# Patient Record
Sex: FEMALE | Employment: FULL TIME | URBAN - METROPOLITAN AREA
[De-identification: names, ages, dates, MRNs, and addresses within clinical notes are randomized per-mention and may not be internally consistent; named-entity substitution may affect disease eponyms.]

---

## 2023-01-30 ENCOUNTER — OFFICE VISIT (OUTPATIENT)
Dept: OBGYN CLINIC | Age: 33
End: 2023-01-30
Payer: COMMERCIAL

## 2023-01-30 VITALS
DIASTOLIC BLOOD PRESSURE: 74 MMHG | WEIGHT: 181 LBS | HEIGHT: 68 IN | SYSTOLIC BLOOD PRESSURE: 106 MMHG | BODY MASS INDEX: 27.43 KG/M2

## 2023-01-30 DIAGNOSIS — N81.11 CYSTOCELE, MIDLINE: ICD-10-CM

## 2023-01-30 DIAGNOSIS — Z01.419 ROUTINE GYNECOLOGICAL EXAMINATION: Primary | ICD-10-CM

## 2023-01-30 PROBLEM — R31.21 ASYMPTOMATIC MICROSCOPIC HEMATURIA: Status: ACTIVE | Noted: 2023-01-30

## 2023-01-30 PROCEDURE — 99385 PREV VISIT NEW AGE 18-39: CPT | Performed by: OBSTETRICS & GYNECOLOGY

## 2023-01-30 NOTE — PROGRESS NOTES
Itzel Gomez is a 33 y.o. female returns for an annual exam     Chief Complaint   Patient presents with    Well Woman       No LMP recorded. (Menstrual status: Other (see Comment)). Her periods are  absent patient is breast feeding . Problems: no significant problems  Birth Control: condoms. Last Pap: normal obtained 2/7/2022. She does not have a history of LU 2, 3 or cervical cancer. With regard to the Gardisil vaccine, she has not received it yet. 1. Have you been to the ER, urgent care clinic, or hospitalized since your last visit? No    2. Have you seen or consulted any other health care providers outside of the [address] [place] [address] since your last visit?  No    Examination chaperoned by Danette Bush MA.

## 2023-01-30 NOTE — PROGRESS NOTES
ANNUAL EXAM AGES 18-39    Maynor Hernandez is a 33y.o. year old  UNAVAILABLE female   No LMP recorded. (Menstrual status: Other (see Comment)). She presents for her annual checkup. No periods. Has been nursing her 15 month old still. No unusual vaginal discharges or urinary symptoms. No health changes. Reviewed breast cancer history and screening advice. Medical History:  Patient Active Problem List   Diagnosis Code    Family history of breast cancer Z80.3    Asymptomatic microscopic hematuria R31.21       History reviewed. No pertinent past medical history. History reviewed. No pertinent surgical history. OB History    Para Term  AB Living   2 2 2 0 0 2   SAB IAB Ectopic Molar Multiple Live Births   0 0 0 0 0 2      # Outcome Date GA Lbr Jose Antonio/2nd Weight Sex Delivery Anes PTL Lv   2 Term 21 39w0d  7 lb 4 oz (3.289 kg) F Vag-Spont   BEBETO   1 Term 19 38w0d  8 lb 6 oz (3.799 kg) M Vag-Spont   BEBETO     Social History     Socioeconomic History    Marital status:     Number of children: 2   Tobacco Use    Smoking status: Never    Smokeless tobacco: Never   Vaping Use    Vaping Use: Never used   Substance and Sexual Activity    Alcohol use: Never    Drug use: Never    Sexual activity: Yes     Partners: Male     Birth control/protection: None      Family History   Problem Relation Age of Onset    Breast Cancer Mother 39        BRCA neg    Breast Cancer Maternal Aunt 60     No current outpatient medications on file prior to visit. No current facility-administered medications on file prior to visit.      No Known Allergies    Review of Systems:  History obtained from the patient-negative for:  Constitutional: weight loss, fever, night sweats  HEENT: hearing loss, earache, congestion, snoring, sorethroat  CV: chest pain, palpitations, edema  Resp: cough, shortness of breath, wheezing  Breast: breast lumps, nipple discharge, galactorrhea  GI: change in bowel habits, abdominal pain, black or bloody stools  : frequency, dysuria, hematuria, vaginal discharge  MSK: back pain, joint pain, muscle pain  Skin: itching, rash, hives  Neuro: dizziness, headache, confusion, weakness  Psych: anxiety, depression, change in mood  Heme/lymph: bleeding, bruising, pallor    Physical Exam  Visit Vitals  /74   Ht 5' 8\" (1.727 m)   Wt 181 lb (82.1 kg)   Breastfeeding Yes   BMI 27.52 kg/m²       Constitutional  Appearance: well-nourished, well developed, alert, in no acute distress    HENT  Head and Face: appears normal    Neck  Inspection/Palpation: normal appearance, no masses or tenderness  Lymph Nodes: no lymphadenopathy present  Thyroid: gland size normal, nontender, no nodules or masses present on palpation    Chest  Respiratory Effort: breathing unlabored  Auscultation: normal breath sounds    Cardiovascular  Heart:   Auscultation: regular rate and rhythm without murmur    Breasts  Inspection of Breasts: breasts symmetrical, no skin changes, no discharge present, nipple appearance normal, no skin retraction present  Palpation of Breasts and Axillae: no masses present on palpation, no breast tenderness  Axillary Lymph Nodes: no lymphadenopathy present    Gastrointestinal  Abdominal Examination: abdomen non-tender to palpation, normal bowel sounds, no masses present  Liver and spleen: no hepatomegaly present, spleen not palpable  Hernias: no hernias identified    Genitourinary  External Genitalia: normal appearance for age, no discharge present, no tenderness present, no inflammatory lesions present, no masses present, no atrophy present  Vagina: normal vaginal vault with mild cystocele, no discharge present, no inflammatory lesions present, no masses present  Bladder: non-tender to palpation  Urethra: appears normal  Cervix: normal   Uterus: normal size, shape and consistency  Adnexa: no adnexal tenderness present, no adnexal masses present  Perineum: perineum within normal limits, no evidence of trauma, no rashes or skin lesions present  Anus: anus within normal limits, no hemorrhoids present  Inguinal Lymph Nodes: no lymphadenopathy present    Skin  General Inspection: no rash, no lesions identified    Neurologic/Psychiatric  Mental Status:  Orientation: grossly oriented to person, place and time  Mood and Affect: mood normal, affect appropriate    Labs:  No results found for this or any previous visit (from the past 12 hour(s)). Assessment:    ICD-10-CM ICD-9-CM    1. Routine gynecological examination  Z01.419 V72.31 PAP IG, APTIMA HPV AND RFX 16/18,45 ([id number])      2.  Cystocele, midline  N81.11 618.01           Plan:  Counseled re: diet, exercise, healthy lifestyle  Rec screening mammo at 36  Kegel's advised  Return in about 1 year (around 1/30/2024) for Annual.

## 2024-01-08 ENCOUNTER — TELEPHONE (OUTPATIENT)
Age: 34
End: 2024-01-08

## 2024-01-08 ENCOUNTER — HOSPITAL ENCOUNTER (EMERGENCY)
Facility: HOSPITAL | Age: 34
Discharge: HOME | End: 2024-01-08
Attending: EMERGENCY MEDICINE
Payer: COMMERCIAL

## 2024-01-08 VITALS
RESPIRATION RATE: 15 BRPM | BODY MASS INDEX: 26.83 KG/M2 | OXYGEN SATURATION: 100 % | WEIGHT: 177 LBS | HEIGHT: 68 IN | TEMPERATURE: 98.5 F | SYSTOLIC BLOOD PRESSURE: 112 MMHG | HEART RATE: 94 BPM | DIASTOLIC BLOOD PRESSURE: 63 MMHG

## 2024-01-08 DIAGNOSIS — R11.10 HYPEREMESIS: Primary | ICD-10-CM

## 2024-01-08 DIAGNOSIS — K52.9 GASTROENTERITIS: ICD-10-CM

## 2024-01-08 LAB
ANION GAP BLD CALC-SCNC: 10 (ref 10–20)
BASOPHILS # BLD: 0 K/UL (ref 0–0.1)
BASOPHILS NFR BLD: 0 % (ref 0–1)
CA-I BLD-MCNC: 1.21 MMOL/L (ref 1.12–1.32)
CHLORIDE BLD-SCNC: 102 MMOL/L (ref 100–108)
CO2 BLD-SCNC: 25 MMOL/L (ref 19–24)
CREAT UR-MCNC: 0.3 MG/DL (ref 0.6–1.3)
DIFFERENTIAL METHOD BLD: ABNORMAL
EOSINOPHIL # BLD: 0 K/UL (ref 0–0.4)
EOSINOPHIL NFR BLD: 0 % (ref 0–7)
ERYTHROCYTE [DISTWIDTH] IN BLOOD BY AUTOMATED COUNT: 12.9 % (ref 11.5–14.5)
GLUCOSE BLD STRIP.AUTO-MCNC: 90 MG/DL (ref 74–106)
HCT VFR BLD AUTO: 37.4 % (ref 35–47)
HGB BLD-MCNC: 13.8 G/DL (ref 11.5–16)
IMM GRANULOCYTES # BLD AUTO: 0 K/UL (ref 0–0.04)
IMM GRANULOCYTES NFR BLD AUTO: 0 % (ref 0–0.5)
LACTATE BLD-SCNC: 0.44 MMOL/L (ref 0.4–2)
LYMPHOCYTES # BLD: 0.7 K/UL (ref 0.8–3.5)
LYMPHOCYTES NFR BLD: 10 % (ref 12–49)
MCH RBC QN AUTO: 31.7 PG (ref 26–34)
MCHC RBC AUTO-ENTMCNC: 36.9 G/DL (ref 30–36.5)
MCV RBC AUTO: 86 FL (ref 80–99)
MONOCYTES # BLD: 0.4 K/UL (ref 0–1)
MONOCYTES NFR BLD: 5 % (ref 5–13)
NEUTS SEG # BLD: 6.1 K/UL (ref 1.8–8)
NEUTS SEG NFR BLD: 85 % (ref 32–75)
NRBC # BLD: 0 K/UL (ref 0–0.01)
NRBC BLD-RTO: 0 PER 100 WBC
PLATELET # BLD AUTO: 236 K/UL (ref 150–400)
PMV BLD AUTO: 9.9 FL (ref 8.9–12.9)
POTASSIUM BLD-SCNC: 3.8 MMOL/L (ref 3.5–5.5)
RBC # BLD AUTO: 4.35 M/UL (ref 3.8–5.2)
RBC MORPH BLD: ABNORMAL
SERVICE CMNT-IMP: ABNORMAL
SODIUM BLD-SCNC: 137 MMOL/L (ref 136–145)
SPECIMEN SITE: ABNORMAL
WBC # BLD AUTO: 7.2 K/UL (ref 3.6–11)

## 2024-01-08 PROCEDURE — 99284 EMERGENCY DEPT VISIT MOD MDM: CPT

## 2024-01-08 PROCEDURE — 6360000002 HC RX W HCPCS: Performed by: PHYSICIAN ASSISTANT

## 2024-01-08 PROCEDURE — 2580000003 HC RX 258: Performed by: PHYSICIAN ASSISTANT

## 2024-01-08 PROCEDURE — 96374 THER/PROPH/DIAG INJ IV PUSH: CPT

## 2024-01-08 PROCEDURE — 83605 ASSAY OF LACTIC ACID: CPT

## 2024-01-08 PROCEDURE — 80047 BASIC METABLC PNL IONIZED CA: CPT

## 2024-01-08 PROCEDURE — 85025 COMPLETE CBC W/AUTO DIFF WBC: CPT

## 2024-01-08 PROCEDURE — 36415 COLL VENOUS BLD VENIPUNCTURE: CPT

## 2024-01-08 PROCEDURE — 96361 HYDRATE IV INFUSION ADD-ON: CPT

## 2024-01-08 PROCEDURE — 2580000003 HC RX 258: Performed by: EMERGENCY MEDICINE

## 2024-01-08 RX ORDER — SODIUM CHLORIDE, SODIUM LACTATE, POTASSIUM CHLORIDE, AND CALCIUM CHLORIDE .6; .31; .03; .02 G/100ML; G/100ML; G/100ML; G/100ML
1000 INJECTION, SOLUTION INTRAVENOUS ONCE
Status: COMPLETED | OUTPATIENT
Start: 2024-01-08 | End: 2024-01-08

## 2024-01-08 RX ORDER — ONDANSETRON 4 MG/1
4 TABLET, FILM COATED ORAL 3 TIMES DAILY PRN
Qty: 60 TABLET | Refills: 0 | Status: SHIPPED | OUTPATIENT
Start: 2024-01-08 | End: 2024-02-07

## 2024-01-08 RX ORDER — METOCLOPRAMIDE HYDROCHLORIDE 5 MG/ML
10 INJECTION INTRAMUSCULAR; INTRAVENOUS
Status: COMPLETED | OUTPATIENT
Start: 2024-01-08 | End: 2024-01-08

## 2024-01-08 RX ORDER — 0.9 % SODIUM CHLORIDE 0.9 %
1000 INTRAVENOUS SOLUTION INTRAVENOUS ONCE
Status: DISCONTINUED | OUTPATIENT
Start: 2024-01-08 | End: 2024-01-08

## 2024-01-08 RX ADMIN — SODIUM CHLORIDE, POTASSIUM CHLORIDE, SODIUM LACTATE AND CALCIUM CHLORIDE 1000 ML: 600; 310; 30; 20 INJECTION, SOLUTION INTRAVENOUS at 14:24

## 2024-01-08 RX ADMIN — METOCLOPRAMIDE 10 MG: 5 INJECTION, SOLUTION INTRAMUSCULAR; INTRAVENOUS at 13:24

## 2024-01-08 RX ADMIN — SODIUM CHLORIDE, POTASSIUM CHLORIDE, SODIUM LACTATE AND CALCIUM CHLORIDE 1000 ML: 600; 310; 30; 20 INJECTION, SOLUTION INTRAVENOUS at 13:24

## 2024-01-08 ASSESSMENT — PAIN - FUNCTIONAL ASSESSMENT
PAIN_FUNCTIONAL_ASSESSMENT: NONE - DENIES PAIN
PAIN_FUNCTIONAL_ASSESSMENT: NONE - DENIES PAIN

## 2024-01-08 NOTE — TELEPHONE ENCOUNTER
Patient advised of MD sent medication to her pharmacy and is going to the er for fluids    Patient verbalized understanding.

## 2024-01-08 NOTE — TELEPHONE ENCOUNTER
Two patient identifiers used      33 year old patient last seen in the office on 1/30//2023 for ae    Patient reports her LMP is 11/15/2023 ( 7w5d ) pregnant    Patient has first ob and ultrasound on 1/23/2024    Patient denies vaginal bleeding and is having nausea and vomiting     Patient reports she can barely keep fluids down and is voiding in small amounts    Patient dealing with the nausea for about a week now and it is getting worse    Patient has tried all the routine things to try, Gatorade, ginger ale , bland foods with out much success    Patient reports zofran ODT in the passed has made her nauseous to keep on her tongue    Pharmacy confirmed    Please advise    Thank you      ? Need er for fluids

## 2024-01-08 NOTE — DISCHARGE INSTRUCTIONS
Continue drinking plenty of liquids.  Take Unisom and vitamin B6 as directed for pregnancy related nausea and vomiting.  You can take Zofran as prescribed by your OB.  Continue to wash hands regularly, try to use a separate bathroom from the rest of the household who are not sick and be sure to disinfect the toilet frequently until your symptoms resolve.   Avoid sharing drinks, utensils or kissing others.  Return to the emergency department if your symptoms worsen.

## 2024-01-08 NOTE — ED PROVIDER NOTES
[facility] EMERGENCY DEPT  EMERGENCY DEPARTMENT ENCOUNTER      Pt Name: Lucrecia Pacheco  MRN: [id number]  Birthdate 1990  Date of evaluation: 2024  Provider: Noel Mckeon PA-C    CHIEF COMPLAINT       Chief Complaint   Patient presents with    Emesis         HISTORY OF PRESENT ILLNESS   (Location/Symptom, Timing/Onset, Context/Setting, Quality, Duration, Modifying Factors, Severity)  Note limiting factors.   34yo  7w5d pregnant who presents ambulatory c/o \"I have been having vomiting 3 times this pregnancy but my kids have the GI bug so I caught that last night.\" C/o vomiting 3 time a day at baseline with this pregnancy, c/w previous pregnancies with hyperemesis. Her 1yo and 5yo had vomiting and diarrhea over the weekend, last night she had 3 loose stools, 8 episodes of emesis, today 3 episodes of emesis- all non-bloody. Denies abd pain, pelvic pain, vaginal discharge or urinary sx's. No flank pain, CP, SOB, fever, chills. OB called in Zofran but she has not filled it yet, states it hasn't helped her much in the past. Has not had confirmatory US with this pregnancy yet but is scheduled to have it with OB on .    OB: Victorino Jenkins    The history is provided by the patient.         Review of External Medical Records:     Nursing Notes were reviewed.    REVIEW OF SYSTEMS    (2-9 systems for level 4, 10 or more for level 5)     Review of Systems    Except as noted above the remainder of the review of systems was reviewed and negative.       PAST MEDICAL HISTORY   History reviewed. No pertinent past medical history.      SURGICAL HISTORY     History reviewed. No pertinent surgical history.      CURRENT MEDICATIONS       Previous Medications    ONDANSETRON (ZOFRAN) 4 MG TABLET    Take 1 tablet by mouth 3 times daily as needed for Nausea or Vomiting       ALLERGIES     Patient has no known allergies.    FAMILY HISTORY       Family History   Problem Relation Age of Onset    Breast Cancer Mother 45        BRCA

## 2024-01-08 NOTE — TELEPHONE ENCOUNTER
Donadlo Jenkins II, MD   to Me       1/8/24  9:32 AM   iF she can't keep anything down then yes ER.  We can also do some phenergan supps.  Can she just swallow the zofran with a sip?        Patient was advised of MD recommendations and can take the zofran with sip of water     Patient would like prescriptions sent to her confirmed pharmacy    Patient was encouraged to go to urgent care [facility] to get fluids, reports she did also have stomach virus last night     Please send  Rx      Thank you

## 2024-01-23 ENCOUNTER — ROUTINE PRENATAL (OUTPATIENT)
Age: 34
End: 2024-01-23

## 2024-01-23 VITALS
BODY MASS INDEX: 26.61 KG/M2 | WEIGHT: 175.6 LBS | SYSTOLIC BLOOD PRESSURE: 106 MMHG | DIASTOLIC BLOOD PRESSURE: 74 MMHG | HEIGHT: 68 IN

## 2024-01-23 DIAGNOSIS — Z3A.09 9 WEEKS GESTATION OF PREGNANCY: Primary | ICD-10-CM

## 2024-01-23 DIAGNOSIS — O46.8X1 SUBCHORIONIC HEMORRHAGE OF PLACENTA IN FIRST TRIMESTER, SINGLE OR UNSPECIFIED FETUS: ICD-10-CM

## 2024-01-23 DIAGNOSIS — O41.8X10 SUBCHORIONIC HEMORRHAGE OF PLACENTA IN FIRST TRIMESTER, SINGLE OR UNSPECIFIED FETUS: ICD-10-CM

## 2024-01-23 PROBLEM — K75.9 INFLAMMATORY LIVER DISEASE: Status: ACTIVE | Noted: 2024-01-23

## 2024-01-23 PROBLEM — O20.8 SUBCHORIONIC HEMORRHAGE IN FIRST TRIMESTER: Status: ACTIVE | Noted: 2024-01-23

## 2024-01-23 LAB
ABO, EXTERNAL RESULT: NORMAL
HEP B, EXTERNAL RESULT: NEGATIVE
HIV, EXTERNAL RESULT: NONREACTIVE
RH FACTOR, EXTERNAL RESULT: POSITIVE
RPR, EXTERNAL RESULT: NONREACTIVE
RUBELLA TITER, EXTERNAL RESULT: NORMAL

## 2024-01-23 PROCEDURE — 0500F INITIAL PRENATAL CARE VISIT: CPT | Performed by: OBSTETRICS & GYNECOLOGY

## 2024-01-23 NOTE — PROGRESS NOTES
Lucrecia Pacheco is a 33 y.o. female presents for a new pregnancy visit.    Chief Complaint   Patient presents with    Initial Prenatal Visit            Patient's last menstrual period was 11/15/2023 (approximate).    Last Pap: normal obtained 2023.    LMP history:  The date of her LMP is 11/15/2023 certain.  Her last menstrual period was normal and lasted for 4 to 5 days.  A urine pregnancy test was positive 4 weeks ago. She was not on the pill at conception.     Based on her LMP, her EDC is 2024 and her EGA is 9 weeks,6 days. Her menstrual cycles are regular and occur approximately every 28 days and range from 3 to 5 days. The last menses lasted  the usual number of days.      Ultrasound data:  She had an ultrasound done by the ultrasound tech today which revealed a viable garcias pregnancy with a gestational age of 9 weeks and 4 days giving an EDC of 2024.TA ULTRASOUND PERFORMED A SINGLE VIABLE 9W4D IUP IS SEEN WITH NORMAL CARDIAC RHYTHM. GESTATIONAL AGE BASED ON TODAY'S ULTRASOUND. A NORMAL YOLK SAC IS SEEN. THERE APPEARS TO BE A SUBCHORIONIC HEMORRHAGE THAT MEASURES 23 X 25 X 18MM. RIGHT OVARY APPEARS WITHIN NORMAL LIMITS. LEFT OVARY APPEARS WITHIN NORMAL LIMITS. NO FREE FLUID SEEN IN THE CDS.     Pregnancy symptoms:    Since her LMP she has experienced urinary frequency, breast tenderness, and nausea.   She has been vomiting over the last few weeks.  Associated signs and symptoms which she denies: dysuria, discharge, vaginal bleeding.    She states she has gained weight:  Approximately 5 pounds over the last few weeks.    Relevant past pregnancy history:   She has the following pregnancy history:     She has no history of  delivery.    Relevant past medical history:(relevant to this pregnancy): noncontributory.      Her occupation is: .     1. Have you been to the ER, urgent care clinic, or hospitalized since your last visit? Yes hyperemesis 2024.    2. Have you seen or 
History obtained from the patient-negative for:  Constitutional: weight loss, fever, night sweats  HEENT: hearing loss, earache, congestion, snoring, sorethroat  CV: chest pain, palpitations, edema  Resp: cough, shortness of breath, wheezing  Breast: breast lumps, nipple discharge, galactorrhea  GI: change in bowel habits, abdominal pain, black or bloody stools  : frequency, dysuria, hematuria, vaginal discharge  MSK: back pain, joint pain, muscle pain  Skin: itching, rash, hives  Neuro: dizziness, headache, confusion, weakness  Psych: anxiety, depression, change in mood  Heme/lymph: bleeding, bruising, pallor      Objective:  /74   Ht 1.727 m (5' 8\")   Wt 79.7 kg (175 lb 9.6 oz)   LMP 11/15/2023 (Approximate)   BMI 26.70 kg/m²     Physical Exam:     Constitutional  Appearance: well-nourished, well developed, alert, in no acute distress    HENT  Head  Face: appears normal  Eyes: appear normal  Ears: normal  Mouth: normal  Lips: no lesions    Gastrointestinal  Abdominal Examination: abdomen non-tender to palpation, normal bowel sounds, no masses present  Liver and spleen: no hepatomegaly present, spleen not palpable  Hernias: no hernias identified    Genitourinary  External Genitalia: normal appearance for age, no discharge present, no tenderness present, no inflammatory lesions present, no masses present, no atrophy present  Vagina: normal vaginal vault without central or paravaginal defects, no discharge present, no inflammatory lesions present, no masses present  Bladder: non-tender to palpation  Urethra: appears normal  Cervix: normal   Uterus: enlarged, normal shape, soft  Adnexa: no adnexal tenderness present, no adnexal masses present  Perineum: perineum within normal limits, no evidence of trauma, no rashes or skin lesions present  Anus: anus within normal limits, no hemorrhoids present  Inguinal Lymph Nodes: no lymphadenopathy present    Skin  General Inspection: no rash, no lesions

## 2024-01-24 PROBLEM — E55.9 VITAMIN D DEFICIENCY: Status: ACTIVE | Noted: 2024-01-24

## 2024-01-24 PROBLEM — O21.0 HYPEREMESIS GRAVIDARUM: Status: ACTIVE | Noted: 2024-01-24

## 2024-01-24 LAB
25(OH)D3+25(OH)D2 SERPL-MCNC: 14.2 NG/ML (ref 30–100)
ABO GROUP BLD: NORMAL
BLD GP AB SCN SERPL QL: NEGATIVE
ERYTHROCYTE [DISTWIDTH] IN BLOOD BY AUTOMATED COUNT: 13.1 % (ref 11.7–15.4)
HBA1C MFR BLD: 4.9 % (ref 4.8–5.6)
HBV SURFACE AG SERPL QL IA: NEGATIVE
HCT VFR BLD AUTO: 40.6 % (ref 34–46.6)
HCV IGG SERPL QL IA: NON REACTIVE
HGB BLD-MCNC: 13.3 G/DL (ref 11.1–15.9)
HIV 1+2 AB+HIV1 P24 AG SERPL QL IA: NON REACTIVE
MCH RBC QN AUTO: 29.4 PG (ref 26.6–33)
MCHC RBC AUTO-ENTMCNC: 32.8 G/DL (ref 31.5–35.7)
MCV RBC AUTO: 90 FL (ref 79–97)
PLATELET # BLD AUTO: 318 X10E3/UL (ref 150–450)
RBC # BLD AUTO: 4.52 X10E6/UL (ref 3.77–5.28)
RH BLD: POSITIVE
RPR SER QL: NON REACTIVE
RUBV IGG SERPL IA-ACNC: 2.63 INDEX
WBC # BLD AUTO: 6.2 X10E3/UL (ref 3.4–10.8)

## 2024-02-13 ENCOUNTER — ROUTINE PRENATAL (OUTPATIENT)
Age: 34
End: 2024-02-13

## 2024-02-13 VITALS — SYSTOLIC BLOOD PRESSURE: 97 MMHG | BODY MASS INDEX: 26.3 KG/M2 | DIASTOLIC BLOOD PRESSURE: 68 MMHG | WEIGHT: 173 LBS

## 2024-02-13 DIAGNOSIS — Z3A.12 12 WEEKS GESTATION OF PREGNANCY: Primary | ICD-10-CM

## 2024-02-13 PROCEDURE — 0502F SUBSEQUENT PRENATAL CARE: CPT | Performed by: OBSTETRICS & GYNECOLOGY

## 2024-02-13 RX ORDER — PROMETHAZINE HYDROCHLORIDE 12.5 MG/1
12.5 TABLET ORAL 4 TIMES DAILY PRN
Qty: 20 TABLET | Refills: 0 | Status: SHIPPED | OUTPATIENT
Start: 2024-02-13 | End: 2024-02-20

## 2024-02-13 NOTE — PROGRESS NOTES
OB VISIT      Current EGA:   12w6d    Visit Notes:  Doing Well. No VB.   Had US today due to SC hemorrhage noted last visit    Total weight gain is Not found..   (Total expected for pregnancy is Could not be calculated)  Last Weight Metrics:      2/13/2024    10:49 AM 1/23/2024    10:20 AM 1/8/2024    12:05 PM 1/30/2023     2:08 PM   Weight Loss Metrics   Height  5' 8\" 5' 8\" 5' 8\"   Weight - Scale 173 lbs 175 lbs 10 oz 177 lbs 181 lbs   BMI (Calculated) 0 kg/m2 26.8 kg/m2 27 kg/m2 27.6 kg/m2        Vitals:    02/13/24 1049   BP: 97/68   Weight: 78.5 kg (173 lb)        Problem List:  Dating by LMP = 9 week US  Subchorionic Hemorrhage @9w 25mm Rescan 12 wk 36mm  Hyperemesis - B6/Unisom  Vitamin D Def 14.  (5000iu)    PN Flowsheet Data:  Prenatal Fetal Information  Fetal HR: U/S  Movement: Absent Albumin: Trace  Glucose: Negative Comments: nausea and vomiting Panorama mod ketone       Current Outpatient Medications   Medication Instructions    Doxylamine Succinate, Sleep, (UNISOM PO) Oral    Prenatal Vit-Fe Fumarate-FA (PRENATAL VITAMIN PO) Oral    promethazine (PHENERGAN) 12.5 mg, Oral, 4 TIMES DAILY PRN    Pyridoxine HCl (B-6 PO) Oral        Visit Diagnoses:   Diagnosis Orders   1. 12 weeks gestation of pregnancy  Panorama Prenatal Test          Follow Up:  Return in about 4 weeks (around 3/12/2024) for Routine OB, Lab Work.  For Panorama NIPT today  Try promethazine  Donaldo Jenkins MD, FACOG  02/13/24  10:57 AM

## 2024-02-13 NOTE — PROGRESS NOTES
LIMITED OB SCAN A SINGLE 12W6D IUP IS SEEN. FETAL CARDIAC MOTION OBSERVED. THERE APPEARS TO BE A LEFT INDRA SUBCHORIONIC HEMORRHAGE THAT MEASURES 36 X 16 X 31MM. APPROPRIATE FETAL GROWTH IS SEEN. SIZE=DATES. SARAH AND PLACENTA APPEAR WITHIN NORMAL LIMITS

## 2024-03-15 ENCOUNTER — ROUTINE PRENATAL (OUTPATIENT)
Age: 34
End: 2024-03-15

## 2024-03-15 VITALS — DIASTOLIC BLOOD PRESSURE: 70 MMHG | BODY MASS INDEX: 26.91 KG/M2 | SYSTOLIC BLOOD PRESSURE: 103 MMHG | WEIGHT: 177 LBS

## 2024-03-15 DIAGNOSIS — Z3A.17 17 WEEKS GESTATION OF PREGNANCY: ICD-10-CM

## 2024-03-15 DIAGNOSIS — Z3A.17 17 WEEKS GESTATION OF PREGNANCY: Primary | ICD-10-CM

## 2024-03-15 NOTE — PROGRESS NOTES
OB VISIT      Current EGA:   17w2d    Visit Notes:  Doing Well. +Fetal Movement.  No Ucs. No LOF or VB.     Total weight gain is 0.635 kg (1 lb 6.4 oz).   (Total expected for pregnancy is 7 kg (15 lb)-11.5 kg (25 lb))  Last Weight Metrics:      3/15/2024     9:06 AM 2/13/2024    10:49 AM 1/23/2024    10:20 AM 1/8/2024    12:05 PM 1/30/2023     2:08 PM   Weight Loss Metrics   Height   5' 8\" 5' 8\" 5' 8\"   Weight - Scale 177 lbs 173 lbs 175 lbs 10 oz 177 lbs 181 lbs   BMI (Calculated) 0 kg/m2 0 kg/m2 26.8 kg/m2 27 kg/m2 27.6 kg/m2        Vitals:    03/15/24 0906   BP: 103/70   Weight: 80.3 kg (177 lb)        Problem List:  Dating by LMP = 9 week US  Subchorionic Hemorrhage @9w 25mm Rescan 12 wk 36mm  Hyperemesis - B6/Unisom  RESOLVED  Vitamin D Def 14.  (5000iu)  Boy (Normal NIPT)    PN Flowsheet Data:  Fundal Height (cm): 17 cm  Prenatal Fetal Information  Fundal Height (cm): 17 cm  Fetal HR: 145  Movement: Absent   Comments: n/v improved       Current Outpatient Medications   Medication Instructions    Prenatal Vit-Fe Fumarate-FA (PRENATAL VITAMIN PO) Oral    vitamin D3 (VITAMIN D3) 5,000 Units, Oral, DAILY        Visit Diagnoses:   Diagnosis Orders   1. 17 weeks gestation of pregnancy  Alpha Fetoprotein, Maternal          Follow Up:  Return in about 3 weeks (around 4/5/2024) for Routine OB, OB Ultrasound.  For AFP jada Jenkins MD, FACOG  03/15/24  9:22 AM

## 2024-03-20 LAB
AFP INTERP SERPL-IMP: NORMAL
AFP MOM SERPL: 0.77
AFP SERPL-MCNC: 27.7 NG/ML
AGE AT DELIVERY: 32.6 YR
AGE OF EGG DONOR: NORMAL
AGE OF EGG DONOR: NORMAL
COMMENT: NORMAL
DONOR EGG?: NORMAL
DONOR EGG?: NORMAL
FAMILY HISTORY NTD: NORMAL
FHX NTD (Y OR N): NORMAL
GA METHOD: NORMAL
GA: 17.3 WEEKS
IDDM PATIENT QL: NO
INSULIN DEP. DIABETIC: NORMAL
Lab: NORMAL
MAT SCN FOR FETAL ABNORMALITIES SERPL: NORMAL
MULTIPLE PREGNANCY: NO
NEURAL TUBE DEFECT RISK FETUS: NORMAL
NUMBER OF FETUSES: NORMAL
OTHER INDICATIONS: NORMAL
OTHER INDICATIONS: NORMAL
PREVIOUSLY ELEVATED AFP (Y OR N): NORMAL
PREVIOUSLY ELEVATED AFP (Y OR N): NORMAL
PRIOR 1ST TRIM TESTING ?: NORMAL
PRIOR 2ND TRIM TESTING ?: NORMAL
PRIOR DS/NTD SCREEN CURRENT PREGNANCY?: NORMAL
PRIOR DS/NTD SCREEN CURRENT PREGNANCY?: NORMAL
PRIOR FIRST TRIMESTER TESTING (Y OR N ): NORMAL
PRIOR PREGNANCY WITH DOWN SYNDROME (Y OR N): NORMAL
PRIOR PREGNANCY WITH DOWN SYNDROME (Y OR N): NORMAL
TYPE OF EGG DONOR: NORMAL
TYPE OF EGG DONOR: NORMAL

## 2024-04-05 ENCOUNTER — ROUTINE PRENATAL (OUTPATIENT)
Age: 34
End: 2024-04-05

## 2024-04-05 VITALS — DIASTOLIC BLOOD PRESSURE: 61 MMHG | SYSTOLIC BLOOD PRESSURE: 100 MMHG | BODY MASS INDEX: 27.73 KG/M2 | WEIGHT: 182.4 LBS

## 2024-04-05 DIAGNOSIS — Z3A.20 20 WEEKS GESTATION OF PREGNANCY: Primary | ICD-10-CM

## 2024-04-05 PROCEDURE — 0502F SUBSEQUENT PRENATAL CARE: CPT | Performed by: OBSTETRICS & GYNECOLOGY

## 2024-04-05 NOTE — PROGRESS NOTES
STANDARD FETAL ANATOMIC SURVEY A SINGLE VIABLE IUP AT 20W2D GA BY LMP IS SEEN. FETAL CARDIAC MOTION OBSERVED. FETAL ANATOMY WELL VISUALIZED AND APPEARS WITHIN NORMAL LIMITS. NO ABNORMALITIES ARE SEEN ON TODAY'S EXAM. APPROPRIATE FETAL GROWTH IS SEEN. SIZE=DATES. SARAH, CERVIX AND PLACENTA APPEAR WITHIN NORMAL LIMITS. GENDER: XY

## 2024-04-05 NOTE — PROGRESS NOTES
OB VISIT      Current EGA:   20w2d    Visit Notes:  Doing Well. +Fetal Movement.  No Ucs. No LOF or VB.   US today WNL    Total weight gain is 3.084 kg (6 lb 12.8 oz).   (Total expected for pregnancy is 7 kg (15 lb)-11.5 kg (25 lb))  Last Weight Metrics:      4/5/2024     1:22 PM 3/15/2024     9:06 AM 2/13/2024    10:49 AM 1/23/2024    10:20 AM 1/8/2024    12:05 PM 1/30/2023     2:08 PM   Weight Loss Metrics   Height    5' 8\" 5' 8\" 5' 8\"   Weight - Scale 182 lbs 6 oz 177 lbs 173 lbs 175 lbs 10 oz 177 lbs 181 lbs   BMI (Calculated) 0 kg/m2 0 kg/m2 0 kg/m2 26.8 kg/m2 27 kg/m2 27.6 kg/m2        Vitals:    04/05/24 1322   BP: 100/61   Weight: 82.7 kg (182 lb 6.4 oz)        Problem List:  Dating by LMP = 9 week US  Subchorionic Hemorrhage @9w 25mm Rescan 12 wk 36mm RESOLVED  Hyperemesis - B6/Unisom  RESOLVED  Vitamin D Def 14.  (5000iu)  Boy (Normal NIPT)  US 20w2d = 20w2d Growth 46%    PN Flowsheet Data:  Fundal Height (cm): 20 cm  Prenatal Fetal Information  Fundal Height (cm): 20 cm  Fetal HR: U/S  Movement: Present Albumin: Negative  Glucose: Negative Comments: doing well       Current Outpatient Medications   Medication Instructions    Prenatal Vit-Fe Fumarate-FA (PRENATAL VITAMIN PO) Oral    vitamin D3 (VITAMIN D3) 5,000 Units, Oral, DAILY        Visit Diagnoses:   Diagnosis Orders   1. 20 weeks gestation of pregnancy            Follow Up:  Return in about 4 weeks (around 5/3/2024).    Donaldo Jenkins MD, FACOG  04/05/24  1:54 PM

## 2024-05-03 ENCOUNTER — ROUTINE PRENATAL (OUTPATIENT)
Age: 34
End: 2024-05-03

## 2024-05-03 VITALS — DIASTOLIC BLOOD PRESSURE: 66 MMHG | SYSTOLIC BLOOD PRESSURE: 99 MMHG | BODY MASS INDEX: 28.25 KG/M2 | WEIGHT: 185.8 LBS

## 2024-05-03 DIAGNOSIS — Z3A.24 24 WEEKS GESTATION OF PREGNANCY: Primary | ICD-10-CM

## 2024-05-03 PROCEDURE — 0502F SUBSEQUENT PRENATAL CARE: CPT | Performed by: OBSTETRICS & GYNECOLOGY

## 2024-05-03 NOTE — PROGRESS NOTES
OB VISIT      Current EGA:   24w2d    Visit Notes:  Doing Well. +Fetal Movement.  No Ucs. No LOF or VB.     Total weight gain is 4.626 kg (10 lb 3.2 oz).   (Total expected for pregnancy is 7 kg (15 lb)-11.5 kg (25 lb))  Last Weight Metrics:      5/3/2024    11:48 AM 4/5/2024     1:22 PM 3/15/2024     9:06 AM 2/13/2024    10:49 AM 1/23/2024    10:20 AM 1/8/2024    12:05 PM 1/30/2023     2:08 PM   Weight Loss Metrics   Height     5' 8\" 5' 8\" 5' 8\"   Weight - Scale 185 lbs 13 oz 182 lbs 6 oz 177 lbs 173 lbs 175 lbs 10 oz 177 lbs 181 lbs   BMI (Calculated) 0 kg/m2 0 kg/m2 0 kg/m2 0 kg/m2 26.8 kg/m2 27 kg/m2 27.6 kg/m2        Vitals:    05/03/24 1148   BP: 99/66   Weight: 84.3 kg (185 lb 12.8 oz)        Problem List:  Dating by LMP = 9 week US  Subchorionic Hemorrhage @9w 25mm Rescan 12 wk 36mm RESOLVED  Hyperemesis - B6/Unisom  RESOLVED  Vitamin D Def 14.  (5000iu)  Boy (Normal NIPT)  US 20w2d = 20w2d Growth 46%    PN Flowsheet Data:  Prenatal Fetal Information  Fetal HR: 148  Movement: Present Albumin: Negative  Glucose: Negative Comments: doing well sm leuko       Current Outpatient Medications   Medication Instructions    Prenatal Vit-Fe Fumarate-FA (PRENATAL VITAMIN PO) Oral    vitamin D3 (CHOLECALCIFEROL) 5,000 Units, Oral, DAILY        Visit Diagnoses:   Diagnosis Orders   1. 24 weeks gestation of pregnancy          Follow Up:  Return in about 4 weeks (around 5/31/2024) for Routine OB, Glucola.    Donaldo Jenkins MD, FACOG  05/03/24  12:03 PM

## 2024-06-03 ENCOUNTER — ROUTINE PRENATAL (OUTPATIENT)
Age: 34
End: 2024-06-03
Payer: COMMERCIAL

## 2024-06-03 VITALS — DIASTOLIC BLOOD PRESSURE: 63 MMHG | BODY MASS INDEX: 28.68 KG/M2 | SYSTOLIC BLOOD PRESSURE: 97 MMHG | WEIGHT: 188.6 LBS

## 2024-06-03 DIAGNOSIS — R79.89 LOW VITAMIN D LEVEL: ICD-10-CM

## 2024-06-03 DIAGNOSIS — R31.9 HEMATURIA, UNSPECIFIED TYPE: ICD-10-CM

## 2024-06-03 DIAGNOSIS — Z23 NEED FOR TDAP VACCINATION: ICD-10-CM

## 2024-06-03 DIAGNOSIS — Z3A.28 28 WEEKS GESTATION OF PREGNANCY: Primary | ICD-10-CM

## 2024-06-03 PROCEDURE — 90471 IMMUNIZATION ADMIN: CPT | Performed by: OBSTETRICS & GYNECOLOGY

## 2024-06-03 PROCEDURE — 90715 TDAP VACCINE 7 YRS/> IM: CPT | Performed by: OBSTETRICS & GYNECOLOGY

## 2024-06-03 PROCEDURE — 0502F SUBSEQUENT PRENATAL CARE: CPT | Performed by: OBSTETRICS & GYNECOLOGY

## 2024-06-03 NOTE — PROGRESS NOTES
OB VISIT      Current EGA:   28w5d    Visit Notes:  Doing Well. +Fetal Movement.  No Ucs. No LOF or VB.   For Glucola/CBC/Vit D today  Tdap today    Total weight gain is 5.896 kg (13 lb).   (Total expected for pregnancy is 7 kg (15 lb)-11.5 kg (25 lb))  Last Weight Metrics:      6/3/2024    10:35 AM 5/3/2024    11:48 AM 4/5/2024     1:22 PM 3/15/2024     9:06 AM 2/13/2024    10:49 AM 1/23/2024    10:20 AM 1/8/2024    12:05 PM   Weight Loss Metrics   Height      5' 8\" 5' 8\"   Weight - Scale 188 lbs 10 oz 185 lbs 13 oz 182 lbs 6 oz 177 lbs 173 lbs 175 lbs 10 oz 177 lbs   BMI (Calculated) 0 kg/m2 0 kg/m2 0 kg/m2 0 kg/m2 0 kg/m2 26.8 kg/m2 27 kg/m2        Vitals:    06/03/24 1035   BP: 97/63   Weight: 85.5 kg (188 lb 9.6 oz)        Problem List:  Dating by LMP = 9 week US  Subchorionic Hemorrhage @9w 25mm Rescan 12 wk 36mm RESOLVED  Hyperemesis - B6/Unisom  RESOLVED  Vitamin D Def 14.  (5000iu)  Boy (Normal NIPT)  US 20w2d = 20w2d Growth 46%    PN Flowsheet Data:  Fundal Height (cm): 28 cm  Prenatal Fetal Information  Fundal Height (cm): 28 cm  Fetal HR: 150  Movement: Present Albumin: Trace  Glucose: Negative Comments: doing well glucola cbc vit d tdap sm leuko tr bld       Current Outpatient Medications   Medication Instructions    Prenatal Vit-Fe Fumarate-FA (PRENATAL VITAMIN PO) Oral    vitamin D3 (CHOLECALCIFEROL) 5,000 Units, Oral, DAILY        Visit Diagnoses:   Diagnosis Orders   1. 28 weeks gestation of pregnancy        2. Low vitamin D level        3. Need for Tdap vaccination            Follow Up:  Return in about 3 weeks (around 6/24/2024) for Routine OB.  Send urine culture  Donaldo Jenkins MD, FACOG  06/03/24  10:50 AM

## 2024-06-03 NOTE — PROGRESS NOTES
After obtaining consent, and per orders of Dr. Jenkins, injection of Tdap given in Right deltoid by ROSALIO STONE CMA. Patient instructed to remain in clinic for 20 minutes afterwards, and to report any adverse reaction to me immediately.

## 2024-06-04 LAB
25(OH)D3 SERPL-MCNC: 31.8 NG/ML (ref 30–100)
ERYTHROCYTE [DISTWIDTH] IN BLOOD BY AUTOMATED COUNT: 14.7 % (ref 11.5–14.5)
GLUCOSE 1H P 100 G GLC PO SERPL-MCNC: 84 MG/DL (ref 65–140)
HCT VFR BLD AUTO: 32.7 % (ref 35–47)
HGB BLD-MCNC: 10.4 G/DL (ref 11.5–16)
MCH RBC QN AUTO: 32 PG (ref 26–34)
MCHC RBC AUTO-ENTMCNC: 31.8 G/DL (ref 30–36.5)
MCV RBC AUTO: 100.6 FL (ref 80–99)
NRBC # BLD: 0 K/UL (ref 0–0.01)
NRBC BLD-RTO: 0 PER 100 WBC
PLATELET # BLD AUTO: 246 K/UL (ref 150–400)
PMV BLD AUTO: 10.1 FL (ref 8.9–12.9)
RBC # BLD AUTO: 3.25 M/UL (ref 3.8–5.2)
WBC # BLD AUTO: 7.5 K/UL (ref 3.6–11)

## 2024-06-05 LAB — BACTERIA UR CULT: NORMAL

## 2024-06-28 ENCOUNTER — ROUTINE PRENATAL (OUTPATIENT)
Age: 34
End: 2024-06-28

## 2024-06-28 VITALS — DIASTOLIC BLOOD PRESSURE: 60 MMHG | BODY MASS INDEX: 29.28 KG/M2 | SYSTOLIC BLOOD PRESSURE: 96 MMHG | WEIGHT: 192.6 LBS

## 2024-06-28 DIAGNOSIS — Z3A.32 32 WEEKS GESTATION OF PREGNANCY: Primary | ICD-10-CM

## 2024-06-28 PROCEDURE — 0502F SUBSEQUENT PRENATAL CARE: CPT | Performed by: OBSTETRICS & GYNECOLOGY

## 2024-06-28 NOTE — PROGRESS NOTES
OB VISIT      Current EGA:   32w2d    Visit Notes:  Doing Well. +Fetal Movement.  No Ucs. No LOF or VB.     Total weight gain is 7.711 kg (17 lb).   (Total expected for pregnancy is 7 kg (15 lb)-11.5 kg (25 lb))  Last Weight Metrics:      6/28/2024     2:53 PM 6/3/2024    10:35 AM 5/3/2024    11:48 AM 4/5/2024     1:22 PM 3/15/2024     9:06 AM 2/13/2024    10:49 AM 1/23/2024    10:20 AM   Weight Loss Metrics   Height       5' 8\"   Weight - Scale 192 lbs 10 oz 188 lbs 10 oz 185 lbs 13 oz 182 lbs 6 oz 177 lbs 173 lbs 175 lbs 10 oz   BMI (Calculated) 0 kg/m2 0 kg/m2 0 kg/m2 0 kg/m2 0 kg/m2 0 kg/m2 26.8 kg/m2        Vitals:    06/28/24 1453   BP: 96/60   Weight: 87.4 kg (192 lb 9.6 oz)        Problem List:  Dating by LMP = 9 week US  Subchorionic Hemorrhage @9w 25mm Rescan 12 wk 36mm RESOLVED  Hyperemesis - B6/Unisom  RESOLVED  Vitamin D Def 14.  (5000iu)  Boy (Normal NIPT)  US 20w2d = 20w2d Growth 46%       PN Flowsheet Data:  Fundal Height (cm): 31 cm  Prenatal Fetal Information  Fundal Height (cm): 31 cm  Fetal HR: 145  Movement: Present Albumin: Negative  Glucose: Negative Comments: doing well       Current Outpatient Medications   Medication Instructions    Prenatal Vit-Fe Fumarate-FA (PRENATAL VITAMIN PO) Oral    vitamin D3 (CHOLECALCIFEROL) 5,000 Units, Oral, DAILY        Visit Diagnoses:   Diagnosis Orders   1. 32 weeks gestation of pregnancy            Follow Up:  Return in about 2 weeks (around 7/12/2024) for Routine OB.    Donaldo Jenkins MD, FACOG  06/28/24  3:04 PM

## 2024-07-15 ENCOUNTER — ROUTINE PRENATAL (OUTPATIENT)
Age: 34
End: 2024-07-15

## 2024-07-15 VITALS — SYSTOLIC BLOOD PRESSURE: 92 MMHG | BODY MASS INDEX: 29.32 KG/M2 | DIASTOLIC BLOOD PRESSURE: 57 MMHG | WEIGHT: 192.8 LBS

## 2024-07-15 DIAGNOSIS — Z3A.34 34 WEEKS GESTATION OF PREGNANCY: Primary | ICD-10-CM

## 2024-07-15 PROCEDURE — 0502F SUBSEQUENT PRENATAL CARE: CPT | Performed by: OBSTETRICS & GYNECOLOGY

## 2024-07-15 NOTE — PROGRESS NOTES
OB VISIT      Current EGA:   34w5d    Visit Notes:  Doing Well. +Fetal Movement.  No Ucs. No LOF or VB.   Occ [facility]    Total weight gain is 7.802 kg (17 lb 3.2 oz).   (Total expected for pregnancy is 7 kg (15 lb)-11.5 kg (25 lb))  Last Weight Metrics:      7/15/2024     3:34 PM 6/28/2024     2:53 PM 6/3/2024    10:35 AM 5/3/2024    11:48 AM 4/5/2024     1:22 PM 3/15/2024     9:06 AM 2/13/2024    10:49 AM   Weight Loss Metrics   Weight - Scale 192 lbs 13 oz 192 lbs 10 oz 188 lbs 10 oz 185 lbs 13 oz 182 lbs 6 oz 177 lbs 173 lbs   BMI (Calculated) 0 kg/m2 0 kg/m2 0 kg/m2 0 kg/m2 0 kg/m2 0 kg/m2 0 kg/m2        Vitals:    07/15/24 1534   BP: (!) 92/57   Weight: 87.5 kg (192 lb 12.8 oz)        Problem List:  Dating by LMP = 9 week US  Subchorionic Hemorrhage @9w 25mm Rescan 12 wk 36mm RESOLVED  Hyperemesis - B6/Unisom  RESOLVED  Vitamin D Def 14.  (5000iu)  Boy (Normal NIPT)  US 20w2d = 20w2d Growth 46%       PN Flowsheet Data:  Fundal Height (cm): 36 cm  Prenatal Fetal Information  Fundal Height (cm): 36 cm  Fetal HR: 148  Movement: Present Albumin: Negative  Glucose: Negative Comments: doing well       Current Outpatient Medications   Medication Instructions    Prenatal Vit-Fe Fumarate-FA (PRENATAL VITAMIN PO) Oral    vitamin D3 (CHOLECALCIFEROL) 5,000 Units, Oral, DAILY        Visit Diagnoses:   Diagnosis Orders   1. 34 weeks gestation of pregnancy            Follow Up:  Return in about 1 week (around 7/22/2024) for Routine OB, OB Ultrasound.    Donaldo Jenkins MD, FACOG  07/15/24  3:47 PM

## 2024-07-22 ENCOUNTER — ROUTINE PRENATAL (OUTPATIENT)
Age: 34
End: 2024-07-22

## 2024-07-22 VITALS — DIASTOLIC BLOOD PRESSURE: 60 MMHG | SYSTOLIC BLOOD PRESSURE: 96 MMHG | WEIGHT: 195.8 LBS | BODY MASS INDEX: 29.77 KG/M2

## 2024-07-22 DIAGNOSIS — Z3A.35 35 WEEKS GESTATION OF PREGNANCY: Primary | ICD-10-CM

## 2024-07-22 LAB — GBS, EXTERNAL RESULT: NEGATIVE

## 2024-07-22 PROCEDURE — 0502F SUBSEQUENT PRENATAL CARE: CPT | Performed by: OBSTETRICS & GYNECOLOGY

## 2024-07-22 NOTE — PROGRESS NOTES
LIMITED OB SCAN A SINGLE VERTEX 35W5D IUP IS SEEN. FETAL CARDIAC MOTION OBSERVED. LIMITED ANATOMY WAS VISUALIZED AND APPEARS WNL. APPROPRIATE FETAL GROWTH IS SEEN. SIZE=DATES. FL < 10%. SARAH AND PLACENTA APPEAR WITHIN NORMAL LIMITS

## 2024-07-22 NOTE — PROGRESS NOTES
OB VISIT      Current EGA:   35w5d    Visit Notes:  Doing Well. +Fetal Movement.  No Ucs. No LOF or VB.   Had US today showing:  LIMITED OB SCAN A SINGLE VERTEX 35W5D IUP IS SEEN. FETAL CARDIAC MOTION OBSERVED. LIMITED ANATOMY WAS VISUALIZED AND APPEARS WNL. APPROPRIATE FETAL GROWTH IS SEEN. SIZE=DATES. FL < 10%. SARAH AND PLACENTA APPEAR WITHIN NORMAL LIMITS       Total weight gain is 9.162 kg (20 lb 3.2 oz).   (Total expected for pregnancy is 7 kg (15 lb)-11.5 kg (25 lb))  Last Weight Metrics:      7/22/2024     2:29 PM 7/15/2024     3:34 PM 6/28/2024     2:53 PM 6/3/2024    10:35 AM 5/3/2024    11:48 AM 4/5/2024     1:22 PM 3/15/2024     9:06 AM   Weight Loss Metrics   Weight - Scale 195 lbs 13 oz 192 lbs 13 oz 192 lbs 10 oz 188 lbs 10 oz 185 lbs 13 oz 182 lbs 6 oz 177 lbs   BMI (Calculated) 0 kg/m2 0 kg/m2 0 kg/m2 0 kg/m2 0 kg/m2 0 kg/m2 0 kg/m2        Vitals:    07/22/24 1429   BP: 96/60   Weight: 88.8 kg (195 lb 12.8 oz)        Problem List:  Dating by LMP = 9 week US  Subchorionic Hemorrhage @9w 25mm Rescan 12 wk 36mm RESOLVED  Hyperemesis - B6/Unisom  RESOLVED  Vitamin D Def 14.  (5000iu)  Boy (Normal NIPT)  US 20w2d = 20w2d Growth 46%  US 35w5d = 36w0d Growth 52%    PN Flowsheet Data:  Fundal Height (cm): 36 cm  Prenatal Fetal Information  Fundal Height (cm): 36 cm  Fetal HR: U/S  Movement: Present Albumin: Negative  Glucose: Negative Comments: doing well GBS Dilation (cm): 2  Effacement: 30  Station: -3     Current Outpatient Medications   Medication Instructions    Prenatal Vit-Fe Fumarate-FA (PRENATAL VITAMIN PO) Oral    vitamin D3 (CHOLECALCIFEROL) 5,000 Units, Oral, DAILY        Visit Diagnoses:   Diagnosis Orders   1. 35 weeks gestation of pregnancy            Follow Up:  Return in about 1 week (around 7/29/2024) for Routine OB.    Donaldo Jenkins MD, FACOG  07/22/24  3:17 PM

## 2024-07-24 LAB — GP B STREP DNA SPEC QL NAA+PROBE: NEGATIVE

## 2024-07-29 ENCOUNTER — ROUTINE PRENATAL (OUTPATIENT)
Age: 34
End: 2024-07-29

## 2024-07-29 VITALS — DIASTOLIC BLOOD PRESSURE: 61 MMHG | SYSTOLIC BLOOD PRESSURE: 99 MMHG | WEIGHT: 195 LBS | BODY MASS INDEX: 29.65 KG/M2

## 2024-07-29 DIAGNOSIS — Z3A.36 36 WEEKS GESTATION OF PREGNANCY: Primary | ICD-10-CM

## 2024-07-29 PROCEDURE — 0502F SUBSEQUENT PRENATAL CARE: CPT | Performed by: OBSTETRICS & GYNECOLOGY

## 2024-07-29 NOTE — PROGRESS NOTES
OB VISIT      Current EGA:   36w5d    Visit Notes:  Doing Well. +Fetal Movement.  Occ runs of Ucs. No LOF or VB.   [facility]    Total weight gain is 8.799 kg (19 lb 6.4 oz).   (Total expected for pregnancy is 7 kg (15 lb)-11.5 kg (25 lb))  Last Weight Metrics:      7/29/2024     3:38 PM 7/22/2024     2:29 PM 7/15/2024     3:34 PM 6/28/2024     2:53 PM 6/3/2024    10:35 AM 5/3/2024    11:48 AM 4/5/2024     1:22 PM   Weight Loss Metrics   Weight - Scale 195 lbs 195 lbs 13 oz 192 lbs 13 oz 192 lbs 10 oz 188 lbs 10 oz 185 lbs 13 oz 182 lbs 6 oz   BMI (Calculated) 0 kg/m2 0 kg/m2 0 kg/m2 0 kg/m2 0 kg/m2 0 kg/m2 0 kg/m2        Vitals:    07/29/24 1538   BP: 99/61   Weight: 88.5 kg (195 lb)        Problem List:  Dating by LMP = 9 week US  Subchorionic Hemorrhage @9w 25mm Rescan 12 wk 36mm RESOLVED  Hyperemesis - B6/Unisom  RESOLVED  Vitamin D Def 14.  (5000iu)  Boy (Normal NIPT)  US 20w2d = 20w2d Growth 46%  US 35w5d = 36w0d Growth 52%    PN Flowsheet Data:  Fundal Height (cm): 36 cm  Prenatal Fetal Information  Fundal Height (cm): 36 cm  Fetal HR: 140  Movement: Present Albumin: Negative  Glucose: Negative Comments: michael Sanchez.  cx 2.5 post Dilation (cm): 2  Effacement: 30  Station: -3     Current Outpatient Medications   Medication Instructions    Prenatal Vit-Fe Fumarate-FA (PRENATAL VITAMIN PO) Oral    vitamin D3 (CHOLECALCIFEROL) 5,000 Units, Oral, DAILY        Visit Diagnoses:   Diagnosis Orders   1. 36 weeks gestation of pregnancy            Follow Up:  Return in about 1 week (around 8/5/2024) for Routine OB.    Donaldo Jenkins MD, FACOG  07/29/24  3:52 PM

## 2024-08-05 ENCOUNTER — ROUTINE PRENATAL (OUTPATIENT)
Age: 34
End: 2024-08-05

## 2024-08-05 VITALS — DIASTOLIC BLOOD PRESSURE: 61 MMHG | WEIGHT: 195.4 LBS | BODY MASS INDEX: 29.71 KG/M2 | SYSTOLIC BLOOD PRESSURE: 99 MMHG

## 2024-08-05 DIAGNOSIS — Z3A.37 37 WEEKS GESTATION OF PREGNANCY: Primary | ICD-10-CM

## 2024-08-05 PROCEDURE — 0502F SUBSEQUENT PRENATAL CARE: CPT | Performed by: OBSTETRICS & GYNECOLOGY

## 2024-08-05 NOTE — PROGRESS NOTES
OB VISIT      Current EGA:   37w5d    Visit Notes:  Doing Well. +Fetal Movement.  No Ucs. No LOF or VB.       Total weight gain is 8.981 kg (19 lb 12.8 oz).   (Total expected for pregnancy is 7 kg (15 lb)-11.5 kg (25 lb))  Last Weight Metrics:      8/5/2024     3:26 PM 7/29/2024     3:38 PM 7/22/2024     2:29 PM 7/15/2024     3:34 PM 6/28/2024     2:53 PM 6/3/2024    10:35 AM 5/3/2024    11:48 AM   Weight Loss Metrics   Weight - Scale 195 lbs 6 oz 195 lbs 195 lbs 13 oz 192 lbs 13 oz 192 lbs 10 oz 188 lbs 10 oz 185 lbs 13 oz   BMI (Calculated) 0 kg/m2 0 kg/m2 0 kg/m2 0 kg/m2 0 kg/m2 0 kg/m2 0 kg/m2        Vitals:    08/05/24 1526   BP: 99/61   Weight: 88.6 kg (195 lb 6.4 oz)        Problem List:  Dating by LMP = 9 week US  Subchorionic Hemorrhage @9w 25mm Rescan 12 wk 36mm RESOLVED  Hyperemesis - B6/Unisom  RESOLVED  Vitamin D Def 14.  (5000iu)  Boy (Normal NIPT)  US 20w2d = 20w2d Growth 46%  US 35w5d = 36w0d Growth 52%    PN Flowsheet Data:  Fundal Height (cm): 37 cm  Prenatal Fetal Information  Fundal Height (cm): 37 cm  Fetal HR: 152  Movement: Present Albumin: Negative  Glucose: Negative Comments: contractions on & off.  Cx 3.5 Dilation (cm): 3  Effacement: 50  Station: -3     Current Outpatient Medications   Medication Instructions    Prenatal Vit-Fe Fumarate-FA (PRENATAL VITAMIN PO) Oral    vitamin D3 (CHOLECALCIFEROL) 5,000 Units, Oral, DAILY        Visit Diagnoses:   Diagnosis Orders   1. 37 weeks gestation of pregnancy            Follow Up:  No follow-ups on file.    Donaldo Jenkins MD, FACOG  08/05/24  3:45 PM

## 2024-08-12 ENCOUNTER — ROUTINE PRENATAL (OUTPATIENT)
Age: 34
End: 2024-08-12

## 2024-08-12 VITALS
WEIGHT: 196.4 LBS | RESPIRATION RATE: 16 BRPM | SYSTOLIC BLOOD PRESSURE: 102 MMHG | DIASTOLIC BLOOD PRESSURE: 68 MMHG | BODY MASS INDEX: 29.86 KG/M2 | HEART RATE: 97 BPM

## 2024-08-12 DIAGNOSIS — Z3A.38 38 WEEKS GESTATION OF PREGNANCY: Primary | ICD-10-CM

## 2024-08-12 PROCEDURE — 0502F SUBSEQUENT PRENATAL CARE: CPT | Performed by: OBSTETRICS & GYNECOLOGY

## 2024-08-12 NOTE — PROGRESS NOTES
OB VISIT      Current EGA:   38w5d    Visit Notes:  Doing Well. +Fetal Movement.  No Ucs. No LOF or VB.   More pelvic pressure    Total weight gain is 9.434 kg (20 lb 12.8 oz).   (Total expected for pregnancy is 7 kg (15 lb)-11.5 kg (25 lb))  Last Weight Metrics:      8/12/2024     2:37 PM 8/5/2024     3:26 PM 7/29/2024     3:38 PM 7/22/2024     2:29 PM 7/15/2024     3:34 PM 6/28/2024     2:53 PM 6/3/2024    10:35 AM   Weight Loss Metrics   Weight - Scale 196 lbs 6 oz 195 lbs 6 oz 195 lbs 195 lbs 13 oz 192 lbs 13 oz 192 lbs 10 oz 188 lbs 10 oz   BMI (Calculated) 0 kg/m2 0 kg/m2 0 kg/m2 0 kg/m2 0 kg/m2 0 kg/m2 0 kg/m2        Vitals:    08/12/24 1437   BP: 102/68   Pulse: 97   Resp: 16   Weight: 89.1 kg (196 lb 6.4 oz)        Problem List:  Dating by LMP = 9 week US  Subchorionic Hemorrhage @9w 25mm Rescan 12 wk 36mm RESOLVED  Hyperemesis - B6/Unisom  RESOLVED  Vitamin D Def 14.  (5000iu)  Boy (Normal NIPT)  US 20w2d = 20w2d Growth 46%  US 35w5d = 36w0d Growth 52%    PN Flowsheet Data:  Fundal Height (cm): 37 cm  Prenatal Fetal Information  Fundal Height (cm): 37 cm  Fetal HR: 154  Movement: Present Albumin: Trace  Glucose: Negative Comments: contractions.  MEmbrane sweep Dilation (cm): 4  Effacement: 60  Station: -3     Current Outpatient Medications   Medication Instructions    Prenatal Vit-Fe Fumarate-FA (PRENATAL VITAMIN PO) Oral    vitamin D3 (CHOLECALCIFEROL) 5,000 Units, Oral, DAILY        Visit Diagnoses:   Diagnosis Orders   1. 38 weeks gestation of pregnancy            Follow Up:  Return in about 1 week (around 8/19/2024) for Routine OB.    Donaldo Jenkins MD, FACOG  08/12/24  3:01 PM  2

## 2024-08-13 ENCOUNTER — ANESTHESIA EVENT (OUTPATIENT)
Facility: HOSPITAL | Age: 34
End: 2024-08-13
Payer: COMMERCIAL

## 2024-08-13 ENCOUNTER — HOSPITAL ENCOUNTER (INPATIENT)
Facility: HOSPITAL | Age: 34
LOS: 1 days | Discharge: HOME | End: 2024-08-14
Attending: OBSTETRICS & GYNECOLOGY | Admitting: OBSTETRICS & GYNECOLOGY
Payer: COMMERCIAL

## 2024-08-13 ENCOUNTER — ANESTHESIA (OUTPATIENT)
Facility: HOSPITAL | Age: 34
End: 2024-08-13
Payer: COMMERCIAL

## 2024-08-13 PROBLEM — Z3A.38 38 WEEKS GESTATION OF PREGNANCY: Status: ACTIVE | Noted: 2024-08-13

## 2024-08-13 LAB
ABO + RH BLD: NORMAL
BASOPHILS # BLD: 0 K/UL (ref 0–0.1)
BASOPHILS NFR BLD: 0 % (ref 0–1)
BLOOD GROUP ANTIBODIES SERPL: NORMAL
DIFFERENTIAL METHOD BLD: ABNORMAL
EOSINOPHIL # BLD: 0.1 K/UL (ref 0–0.4)
EOSINOPHIL NFR BLD: 1 % (ref 0–7)
ERYTHROCYTE [DISTWIDTH] IN BLOOD BY AUTOMATED COUNT: 14.6 % (ref 11.5–14.5)
HCT VFR BLD AUTO: 30.9 % (ref 35–47)
HGB BLD-MCNC: 10.7 G/DL (ref 11.5–16)
IMM GRANULOCYTES # BLD AUTO: 0.1 K/UL (ref 0–0.04)
IMM GRANULOCYTES NFR BLD AUTO: 1 % (ref 0–0.5)
LYMPHOCYTES # BLD: 2.1 K/UL (ref 0.8–3.5)
LYMPHOCYTES NFR BLD: 23 % (ref 12–49)
MCH RBC QN AUTO: 30.5 PG (ref 26–34)
MCHC RBC AUTO-ENTMCNC: 34.6 G/DL (ref 30–36.5)
MCV RBC AUTO: 88 FL (ref 80–99)
MONOCYTES # BLD: 0.6 K/UL (ref 0–1)
MONOCYTES NFR BLD: 6 % (ref 5–13)
NEUTS SEG # BLD: 6.4 K/UL (ref 1.8–8)
NEUTS SEG NFR BLD: 69 % (ref 32–75)
NRBC # BLD: 0 K/UL (ref 0–0.01)
NRBC BLD-RTO: 0 PER 100 WBC
PLATELET # BLD AUTO: 258 K/UL (ref 150–400)
PMV BLD AUTO: 9.9 FL (ref 8.9–12.9)
RBC # BLD AUTO: 3.51 M/UL (ref 3.8–5.2)
RPR SER QL: NONREACTIVE
SPECIMEN EXP DATE BLD: NORMAL
WBC # BLD AUTO: 9.2 K/UL (ref 3.6–11)

## 2024-08-13 PROCEDURE — 6370000000 HC RX 637 (ALT 250 FOR IP): Performed by: OBSTETRICS & GYNECOLOGY

## 2024-08-13 PROCEDURE — 86901 BLOOD TYPING SEROLOGIC RH(D): CPT

## 2024-08-13 PROCEDURE — 7210000100 HC LABOR FEE PER 1 HR: Performed by: OBSTETRICS & GYNECOLOGY

## 2024-08-13 PROCEDURE — 6360000002 HC RX W HCPCS: Performed by: OBSTETRICS & GYNECOLOGY

## 2024-08-13 PROCEDURE — 86850 RBC ANTIBODY SCREEN: CPT

## 2024-08-13 PROCEDURE — 86900 BLOOD TYPING SEROLOGIC ABO: CPT

## 2024-08-13 PROCEDURE — 3700000025 EPIDURAL BLOCK: Performed by: ANESTHESIOLOGY

## 2024-08-13 PROCEDURE — 94761 N-INVAS EAR/PLS OXIMETRY MLT: CPT

## 2024-08-13 PROCEDURE — 1120000000 HC RM PRIVATE OB

## 2024-08-13 PROCEDURE — 85025 COMPLETE CBC W/AUTO DIFF WBC: CPT

## 2024-08-13 PROCEDURE — 36415 COLL VENOUS BLD VENIPUNCTURE: CPT

## 2024-08-13 PROCEDURE — 2500000003 HC RX 250 WO HCPCS: Performed by: NURSE ANESTHETIST, CERTIFIED REGISTERED

## 2024-08-13 PROCEDURE — 59400 OBSTETRICAL CARE: CPT | Performed by: OBSTETRICS & GYNECOLOGY

## 2024-08-13 PROCEDURE — 6360000002 HC RX W HCPCS: Performed by: NURSE ANESTHETIST, CERTIFIED REGISTERED

## 2024-08-13 PROCEDURE — 7220000101 HC DELIVERY VAGINAL/SINGLE: Performed by: OBSTETRICS & GYNECOLOGY

## 2024-08-13 PROCEDURE — 2580000003 HC RX 258: Performed by: OBSTETRICS & GYNECOLOGY

## 2024-08-13 PROCEDURE — 86592 SYPHILIS TEST NON-TREP QUAL: CPT

## 2024-08-13 PROCEDURE — 3700000156 HC EPIDURAL ANESTHESIA: Performed by: NURSE ANESTHETIST, CERTIFIED REGISTERED

## 2024-08-13 RX ORDER — TRANEXAMIC ACID 10 MG/ML
1000 INJECTION, SOLUTION INTRAVENOUS
Status: ACTIVE | OUTPATIENT
Start: 2024-08-13 | End: 2024-08-14

## 2024-08-13 RX ORDER — OXYCODONE HYDROCHLORIDE 5 MG/1
10 TABLET ORAL EVERY 4 HOURS PRN
Status: DISCONTINUED | OUTPATIENT
Start: 2024-08-13 | End: 2024-08-14 | Stop reason: HOSPADM

## 2024-08-13 RX ORDER — SODIUM CHLORIDE 0.9 % (FLUSH) 0.9 %
5-40 SYRINGE (ML) INJECTION PRN
Status: DISCONTINUED | OUTPATIENT
Start: 2024-08-13 | End: 2024-08-14 | Stop reason: HOSPADM

## 2024-08-13 RX ORDER — ONDANSETRON 2 MG/ML
4 INJECTION INTRAMUSCULAR; INTRAVENOUS EVERY 6 HOURS PRN
Status: DISCONTINUED | OUTPATIENT
Start: 2024-08-13 | End: 2024-08-14 | Stop reason: HOSPADM

## 2024-08-13 RX ORDER — METHYLERGONOVINE MALEATE 0.2 MG/ML
200 INJECTION INTRAVENOUS PRN
Status: DISCONTINUED | OUTPATIENT
Start: 2024-08-13 | End: 2024-08-14 | Stop reason: HOSPADM

## 2024-08-13 RX ORDER — DOCUSATE SODIUM 100 MG/1
100 CAPSULE, LIQUID FILLED ORAL 2 TIMES DAILY
Status: DISCONTINUED | OUTPATIENT
Start: 2024-08-13 | End: 2024-08-13

## 2024-08-13 RX ORDER — SODIUM CHLORIDE, SODIUM LACTATE, POTASSIUM CHLORIDE, AND CALCIUM CHLORIDE .6; .31; .03; .02 G/100ML; G/100ML; G/100ML; G/100ML
500 INJECTION, SOLUTION INTRAVENOUS PRN
Status: DISCONTINUED | OUTPATIENT
Start: 2024-08-13 | End: 2024-08-13

## 2024-08-13 RX ORDER — LANOLIN/MINERAL OIL
LOTION (ML) TOPICAL PRN
Status: DISCONTINUED | OUTPATIENT
Start: 2024-08-13 | End: 2024-08-14 | Stop reason: HOSPADM

## 2024-08-13 RX ORDER — ACETAMINOPHEN 325 MG/1
650 TABLET ORAL EVERY 4 HOURS PRN
Status: DISCONTINUED | OUTPATIENT
Start: 2024-08-13 | End: 2024-08-13

## 2024-08-13 RX ORDER — ONDANSETRON 2 MG/ML
4 INJECTION INTRAMUSCULAR; INTRAVENOUS EVERY 6 HOURS PRN
Status: DISCONTINUED | OUTPATIENT
Start: 2024-08-13 | End: 2024-08-13

## 2024-08-13 RX ORDER — LIDOCAINE HYDROCHLORIDE AND EPINEPHRINE 15; 5 MG/ML; UG/ML
INJECTION, SOLUTION EPIDURAL PRN
Status: DISCONTINUED | OUTPATIENT
Start: 2024-08-13 | End: 2024-08-13 | Stop reason: SDUPTHER

## 2024-08-13 RX ORDER — SODIUM CHLORIDE 0.9 % (FLUSH) 0.9 %
5-40 SYRINGE (ML) INJECTION EVERY 12 HOURS SCHEDULED
Status: DISCONTINUED | OUTPATIENT
Start: 2024-08-13 | End: 2024-08-14 | Stop reason: HOSPADM

## 2024-08-13 RX ORDER — NALOXONE HYDROCHLORIDE 0.4 MG/ML
INJECTION, SOLUTION INTRAMUSCULAR; INTRAVENOUS; SUBCUTANEOUS PRN
Status: DISCONTINUED | OUTPATIENT
Start: 2024-08-13 | End: 2024-08-13

## 2024-08-13 RX ORDER — CARBOPROST TROMETHAMINE 250 UG/ML
250 INJECTION, SOLUTION INTRAMUSCULAR PRN
Status: DISCONTINUED | OUTPATIENT
Start: 2024-08-13 | End: 2024-08-13

## 2024-08-13 RX ORDER — FENTANYL/BUPIVACAINE/NS/PF 2-1250MCG
12 PLASTIC BAG, INJECTION (ML) INJECTION CONTINUOUS
Status: DISCONTINUED | OUTPATIENT
Start: 2024-08-13 | End: 2024-08-13

## 2024-08-13 RX ORDER — DOCUSATE SODIUM 100 MG/1
100 CAPSULE, LIQUID FILLED ORAL 2 TIMES DAILY
Status: DISCONTINUED | OUTPATIENT
Start: 2024-08-13 | End: 2024-08-14 | Stop reason: HOSPADM

## 2024-08-13 RX ORDER — METHYLERGONOVINE MALEATE 0.2 MG/ML
200 INJECTION INTRAVENOUS PRN
Status: DISCONTINUED | OUTPATIENT
Start: 2024-08-13 | End: 2024-08-13

## 2024-08-13 RX ORDER — ACETAMINOPHEN 500 MG
1000 TABLET ORAL EVERY 8 HOURS SCHEDULED
Status: DISCONTINUED | OUTPATIENT
Start: 2024-08-13 | End: 2024-08-14

## 2024-08-13 RX ORDER — CARBOPROST TROMETHAMINE 250 UG/ML
250 INJECTION, SOLUTION INTRAMUSCULAR
Status: ACTIVE | OUTPATIENT
Start: 2024-08-13 | End: 2024-08-14

## 2024-08-13 RX ORDER — MISOPROSTOL 200 UG/1
400 TABLET ORAL PRN
Status: DISCONTINUED | OUTPATIENT
Start: 2024-08-13 | End: 2024-08-14 | Stop reason: HOSPADM

## 2024-08-13 RX ORDER — SODIUM CHLORIDE 0.9 % (FLUSH) 0.9 %
5-40 SYRINGE (ML) INJECTION EVERY 12 HOURS SCHEDULED
Status: DISCONTINUED | OUTPATIENT
Start: 2024-08-13 | End: 2024-08-13

## 2024-08-13 RX ORDER — ONDANSETRON 4 MG/1
4 TABLET, ORALLY DISINTEGRATING ORAL EVERY 6 HOURS PRN
Status: DISCONTINUED | OUTPATIENT
Start: 2024-08-13 | End: 2024-08-14 | Stop reason: HOSPADM

## 2024-08-13 RX ORDER — DIPHENHYDRAMINE HYDROCHLORIDE 50 MG/ML
25 INJECTION INTRAMUSCULAR; INTRAVENOUS EVERY 4 HOURS PRN
Status: DISCONTINUED | OUTPATIENT
Start: 2024-08-13 | End: 2024-08-13

## 2024-08-13 RX ORDER — SODIUM CHLORIDE, SODIUM LACTATE, POTASSIUM CHLORIDE, CALCIUM CHLORIDE 600; 310; 30; 20 MG/100ML; MG/100ML; MG/100ML; MG/100ML
INJECTION, SOLUTION INTRAVENOUS CONTINUOUS
Status: DISCONTINUED | OUTPATIENT
Start: 2024-08-13 | End: 2024-08-13

## 2024-08-13 RX ORDER — IBUPROFEN 800 MG/1
800 TABLET ORAL EVERY 8 HOURS SCHEDULED
Status: DISCONTINUED | OUTPATIENT
Start: 2024-08-13 | End: 2024-08-14 | Stop reason: HOSPADM

## 2024-08-13 RX ORDER — SODIUM CHLORIDE, SODIUM LACTATE, POTASSIUM CHLORIDE, CALCIUM CHLORIDE 600; 310; 30; 20 MG/100ML; MG/100ML; MG/100ML; MG/100ML
INJECTION, SOLUTION INTRAVENOUS CONTINUOUS
Status: DISCONTINUED | OUTPATIENT
Start: 2024-08-13 | End: 2024-08-14 | Stop reason: HOSPADM

## 2024-08-13 RX ORDER — DIPHENHYDRAMINE HCL 25 MG
25 CAPSULE ORAL EVERY 4 HOURS PRN
Status: DISCONTINUED | OUTPATIENT
Start: 2024-08-13 | End: 2024-08-13

## 2024-08-13 RX ORDER — SODIUM CHLORIDE 9 MG/ML
INJECTION, SOLUTION INTRAVENOUS PRN
Status: DISCONTINUED | OUTPATIENT
Start: 2024-08-13 | End: 2024-08-14 | Stop reason: HOSPADM

## 2024-08-13 RX ORDER — SODIUM CHLORIDE 9 MG/ML
INJECTION, SOLUTION INTRAVENOUS PRN
Status: DISCONTINUED | OUTPATIENT
Start: 2024-08-13 | End: 2024-08-13

## 2024-08-13 RX ORDER — SODIUM CHLORIDE 0.9 % (FLUSH) 0.9 %
5-40 SYRINGE (ML) INJECTION PRN
Status: DISCONTINUED | OUTPATIENT
Start: 2024-08-13 | End: 2024-08-13

## 2024-08-13 RX ORDER — OXYCODONE HYDROCHLORIDE 5 MG/1
5 TABLET ORAL EVERY 4 HOURS PRN
Status: DISCONTINUED | OUTPATIENT
Start: 2024-08-13 | End: 2024-08-14 | Stop reason: HOSPADM

## 2024-08-13 RX ORDER — BUPIVACAINE HYDROCHLORIDE 2.5 MG/ML
INJECTION, SOLUTION EPIDURAL; INFILTRATION; INTRACAUDAL PRN
Status: DISCONTINUED | OUTPATIENT
Start: 2024-08-13 | End: 2024-08-13 | Stop reason: SDUPTHER

## 2024-08-13 RX ORDER — SODIUM CHLORIDE, SODIUM LACTATE, POTASSIUM CHLORIDE, AND CALCIUM CHLORIDE .6; .31; .03; .02 G/100ML; G/100ML; G/100ML; G/100ML
1000 INJECTION, SOLUTION INTRAVENOUS PRN
Status: DISCONTINUED | OUTPATIENT
Start: 2024-08-13 | End: 2024-08-13

## 2024-08-13 RX ORDER — LIDOCAINE HYDROCHLORIDE 20 MG/ML
INJECTION, SOLUTION EPIDURAL; INFILTRATION; INTRACAUDAL; PERINEURAL PRN
Status: DISCONTINUED | OUTPATIENT
Start: 2024-08-13 | End: 2024-08-13 | Stop reason: SDUPTHER

## 2024-08-13 RX ORDER — EPHEDRINE SULFATE/0.9% NACL/PF 25 MG/5 ML
10 SYRINGE (ML) INTRAVENOUS ONCE
Status: DISCONTINUED | OUTPATIENT
Start: 2024-08-13 | End: 2024-08-13

## 2024-08-13 RX ORDER — TRANEXAMIC ACID 10 MG/ML
1000 INJECTION, SOLUTION INTRAVENOUS
Status: DISCONTINUED | OUTPATIENT
Start: 2024-08-13 | End: 2024-08-13

## 2024-08-13 RX ORDER — TERBUTALINE SULFATE 1 MG/ML
0.25 INJECTION, SOLUTION SUBCUTANEOUS
Status: DISCONTINUED | OUTPATIENT
Start: 2024-08-13 | End: 2024-08-13

## 2024-08-13 RX ADMIN — IBUPROFEN 800 MG: 800 TABLET, FILM COATED ORAL at 12:52

## 2024-08-13 RX ADMIN — OXYTOCIN 87.3 MILLI-UNITS/MIN: 10 INJECTION, SOLUTION INTRAMUSCULAR; INTRAVENOUS at 07:45

## 2024-08-13 RX ADMIN — Medication 10 ML/HR: at 04:36

## 2024-08-13 RX ADMIN — BUPIVACAINE HYDROCHLORIDE 8 ML: 2.5 INJECTION, SOLUTION EPIDURAL; INFILTRATION; INTRACAUDAL; PERINEURAL at 04:00

## 2024-08-13 RX ADMIN — ACETAMINOPHEN 1000 MG: 500 TABLET ORAL at 16:32

## 2024-08-13 RX ADMIN — ACETAMINOPHEN 650 MG: 325 TABLET ORAL at 08:34

## 2024-08-13 RX ADMIN — IBUPROFEN 800 MG: 800 TABLET, FILM COATED ORAL at 20:55

## 2024-08-13 RX ADMIN — DOCUSATE SODIUM 100 MG: 100 CAPSULE, LIQUID FILLED ORAL at 20:55

## 2024-08-13 RX ADMIN — METHYLERGONOVINE MALEATE 200 MCG: 0.2 INJECTION, SOLUTION INTRAMUSCULAR; INTRAVENOUS at 07:26

## 2024-08-13 RX ADMIN — LIDOCAINE HYDROCHLORIDE AND EPINEPHRINE 2 ML: 15; 5 INJECTION, SOLUTION EPIDURAL at 04:00

## 2024-08-13 RX ADMIN — LIDOCAINE HYDROCHLORIDE AND EPINEPHRINE 3 ML: 15; 5 INJECTION, SOLUTION EPIDURAL at 03:53

## 2024-08-13 RX ADMIN — SODIUM CHLORIDE, POTASSIUM CHLORIDE, SODIUM LACTATE AND CALCIUM CHLORIDE 1000 ML: 600; 310; 30; 20 INJECTION, SOLUTION INTRAVENOUS at 03:38

## 2024-08-13 RX ADMIN — SODIUM CHLORIDE, POTASSIUM CHLORIDE, SODIUM LACTATE AND CALCIUM CHLORIDE 1000 ML: 600; 310; 30; 20 INJECTION, SOLUTION INTRAVENOUS at 03:15

## 2024-08-13 RX ADMIN — LIDOCAINE HYDROCHLORIDE 10 ML: 20 INJECTION, SOLUTION EPIDURAL; INFILTRATION; INTRACAUDAL; PERINEURAL at 04:23

## 2024-08-13 ASSESSMENT — PAIN DESCRIPTION - ORIENTATION
ORIENTATION: LOWER

## 2024-08-13 ASSESSMENT — PAIN SCALES - GENERAL
PAINLEVEL_OUTOF10: 3

## 2024-08-13 ASSESSMENT — PAIN DESCRIPTION - DESCRIPTORS
DESCRIPTORS: CRAMPING
DESCRIPTORS: SORE;DISCOMFORT
DESCRIPTORS: CRAMPING

## 2024-08-13 ASSESSMENT — PAIN DESCRIPTION - LOCATION
LOCATION: ABDOMEN;BACK
LOCATION: PERINEUM

## 2024-08-13 NOTE — ANESTHESIA PRE PROCEDURE
Department of Anesthesiology  Preprocedure Note       Name:  Lucrecia Pacheco   Age:  33 y.o.  :  1990                                          MRN:  [id number]         Date:  2024      Surgeon: * No surgeons listed *    Procedure: * No procedures listed *    Medications prior to admission:   Prior to Admission medications    Medication Sig Start Date End Date Taking? Authorizing Provider   Vitamin D3 125 MCG (5000 UT) TABS tablet Take 1 tablet by mouth daily    ProviderAbby MD   Prenatal Vit-Fe Fumarate-FA (PRENATAL VITAMIN PO) Take by mouth    ProviderAbby MD       Current medications:    Current Facility-Administered Medications   Medication Dose Route Frequency Provider Last Rate Last Admin    terbutaline (BRETHINE) injection 0.25 mg  0.25 mg SubCUTAneous Once PRN Rashaun Ochoa MD        lactated ringers IV soln infusion   IntraVENous Continuous Rashaun Ochoa MD        lactated ringers bolus 500 mL  500 mL IntraVENous PRN Rashaun Ochoa MD        Or    lactated ringers bolus 1,000 mL  1,000 mL IntraVENous PRN Rashaun Ochoa  mL/hr at 24 0338 1,000 mL at 24 0338    sodium chloride flush 0.9 % injection 5-40 mL  5-40 mL IntraVENous 2 times per day Rashaun Ochoa MD        sodium chloride flush 0.9 % injection 5-40 mL  5-40 mL IntraVENous PRN Rashaun Ochoa MD        0.9 % sodium chloride infusion   IntraVENous PRN Rashaun Ochoa MD        methylergonovine (METHERGINE) injection 200 mcg  200 mcg IntraMUSCular PRN Rashaun Ochoa MD        carboprost (HEMABATE) injection 250 mcg  250 mcg IntraMUSCular PRN Rashaun Ochoa MD        tranexamic acid-NaCl IVPB premix 1,000 mg  1,000 mg IntraVENous Once PRN Rashaun Ochoa MD        acetaminophen (TYLENOL) tablet 650 mg  650 mg Oral Q4H PRN Rashanu Ochoa MD        benzocaine-menthol (DERMOPLAST) 20-0.5 % spray   Topical PRN Rashaun Ochoa MD        docusate sodium

## 2024-08-13 NOTE — L&D DELIVERY NOTE
Delivery Procedure Note    2024    Delivery Physician:  Rashaun Ochoa MD    Procedure: Spontaneous vaginal delivery, She delivered quickly, with only the nurse in attendance.  I delivered the placenta      Anesthesia: Epidural    Monitor:  Fetal Heart Tones-External and Uterine Contractions- External    Estimated Blood Loss: 500    Episiotomy: Episiotomy: None    Laceration(s):  None    Laceration(s) repair: No    Suture used for repair: None    Fetal Description: garcias    Fetal Position: Occiput Anterior     Interventions: Nasopharyngeal/Oropharyngeal Suctioning, Warming/Drying, and Monitoring vital signs    Birth:   Baby Weight:   Information for the patient's :  Storm Pacheco [[id number]]   Birth Weight: N/A    Baby Apgar 1 min:   Information for the patient's :  Storm Pacheco [[id number]]   APGAR One: N/A    Baby Apgar 5 min:   Information for the patient's :  Storm Pacheco [[id number]]   APGAR Five: N/A    Baby :   Information for the patient's :  Storm Pacheco [[id number]]   2024    Baby Type of Delivery:   Information for the patient's :  Storm Pacheco [[id number]]   Delivery Method: N/A    Baby Gender:   Information for the patient's :  Storm Pacheco [[id number]]   male    Apgar - One Minute: 8    Apgar - Five Minutes: 9    Umbilical Cord: 3 vessels present    Placenta Removal: expressed    Placenta Appearance: normal intact    Specimens: None           Complications:none      Signed By:  Rashaun Ochoa MD     2024

## 2024-08-13 NOTE — H&P
contractions.    HEENT:   Normocephalic.  Pupils are equal, round, and reactive to light.  Extraocular movements are intact.  Pharynx is clear.  Neck:   Normal range of motion.  No thyromegaly.   Heart:   Regular rate and rhythm.  No murmurs present.  Lungs:   Clear, no rales, rhonchi, or wheezes.  Abdomen:   Soft, gravid, not tender, normal bowel sounds.  No CVA tenderness   Leopold's: Vertex   EFW 7 - 8 pounds   Uterine Activity:    Frequency: Every 3 - 4 minutes    Duration: 60 seconds    Intensity: moderate  Fetal Heart Rate:     Baseline: 135 bpm    Variability: Moderate    Accelerations: Present (15 x 15 bpm)    Decelerations: None  Category: 1  Pelvic:    Membranes:Intact   Cervical Exam:     Position Posterior    Condition Soft  Presentation Vertex  Dilation  4 - 5 cms    Effacement 70 %    Station -2    Exam by RN   Pelvis is adequate for vaginal delivery.  Extremites:   Trace bilateral pedal edema.  Full range of motion.  Neuro:    Alert. Oriented.  CN 2 - 12 intact.  Motor and sensory exam grossly normal.      Prenatal Labs   No results found for: \"ABORH\", \"RUBELLAEXT\", \"GRBSEXT\", \"HBSAGEXT\", \"HIVEXT\", \"RPREXT\", \"GONNOEXT\"  No results found for this or any previous visit (from the past 12 hour(s)).      Assessment/Plan:     Principal Problem:    38 weeks gestation of pregnancy  Resolved Problems:    * No resolved hospital problems. *    Admit for labor  Epidural as needed      Rashaun Ochoa MD  8/13/2024

## 2024-08-13 NOTE — ANESTHESIA PROCEDURE NOTES
Epidural Block    Patient location during procedure: OB  Start time: 8/13/2024 3:43 AM  End time: 8/13/2024 4:02 AM  Reason for block: labor epidural  Staffing  Performed: resident/CRNA   Anesthesiologist: Thanh Macdonald MD  Resident/CRNA: Kaur Cruz APRN - CRNA  Performed by: Kaur Cruz APRN - CRNA  Authorized by: Thanh Macdonald MD    Epidural  Patient position: sitting  Prep: ChloraPrep  Patient monitoring: continuous pulse ox and frequent blood pressure checks  Approach: midline  Location: L3-4  Injection technique: ALFRED saline  Provider prep: sterile gloves and mask  Needle  Needle type: Tuohy   Needle gauge: 17 G  Needle length: 3.5 in  Needle insertion depth: 7 cm  Catheter type: multi-orifice  Catheter size: 20 G  Catheter at skin depth: 11 cm  Test dose: negativeCatheter Secured: tegaderm and tape  Assessment  Hemodynamics: stable  Attempts: 1  Outcomes: uncomplicated and patient tolerated procedure well  Preanesthetic Checklist  Completed: patient identified, IV checked, site marked, risks and benefits discussed, surgical/procedural consents, equipment checked, pre-op evaluation, timeout performed, anesthesia consent given, oxygen available, monitors applied/VS acknowledged, fire risk safety assessment completed and verbalized and blood product R/B/A discussed and consented

## 2024-08-13 NOTE — LACTATION NOTE
This note was copied from a baby's chart.  This is mother's 3rd baby to .  She nursed her first children for between 9 and 18 months. First latch score entered by Nursery was 9/10. Mother states nursing is going well, without discomfort, and she is offering the breast to early cues of hunger.  Normal  behaviors and output expectations reviewed.  Mother encouraged to call next feed. Lanolin to bedside per pt request.    Discussed with mother her plan for feeding.  Reviewed the benefits of exclusive breast milk feeding during the hospital stay.   Informed her of the risks of using formula to supplement in the first few days of life as well as the benefits of successful breast milk feeding; referred her to the Breastfeeding booklet about this information.   She acknowledges understanding of information reviewed and states that it is her plan to breastfeed her infant.  Will support her choice and offer additional information as needed.     Reviewed breastfeeding basics:  How milk is made and normal  breastfeeding behaviors discussed.  Supply and demand,  stomach size, early feeding cues, skin to skin bonding with comfortable positioning and baby led latch-on reviewed.  How to identify signs of successful breastfeeding sessions reviewed; education on asymetrical latch, signs of effective latching vs shallow, in-effective latching, normal  feeding frequency and duration and expected infant output discussed.  Normal course of breastfeeding discussed including the AAP's recommendation that children receive exclusive breast milk feedings for the first six months of life with breast milk feedings to continue through the first year of life and/or beyond as complimentary table foods are added.  Breastfeeding Booklet and Warm line information provided with discussion.  Discussed typical  weight loss and the importance of pediatrician appointment within 24-48 hours of discharge, at 2 weeks of

## 2024-08-13 NOTE — ANESTHESIA POSTPROCEDURE EVALUATION
Department of Anesthesiology  Postprocedure Note    Patient: Lucrecia Pacheco  MRN: [id number]  YOB: 1990  Date of evaluation: 8/13/2024    Procedure Summary       Date: 08/13/24 Room / Location:     Anesthesia Start: 0343 Anesthesia Stop: 0619    Procedure: Labor Analgesia Diagnosis:     Scheduled Providers:  Responsible Provider: Thanh Macdonald MD    Anesthesia Type: epidural ASA Status: 2            Anesthesia Type: No value filed.    Killian Phase I:      Killian Phase II:      Anesthesia Post Evaluation    No notable events documented.

## 2024-08-13 NOTE — PLAN OF CARE
Problem: Pain  Goal: Verbalizes/displays adequate comfort level or baseline comfort level  Outcome: Progressing     Problem: Vaginal Birth or  Section  Goal: Fetal and maternal status remain reassuring during the birth process  Description:  Birth OB-Pregnancy care plan goal which identifies if the fetal and maternal status remain reassuring during the birth process  Outcome: Progressing     Problem: Safety - Adult  Goal: Free from fall injury  Outcome: Progressing     Problem: Chronic Conditions and Co-morbidities  Goal: Patient's chronic conditions and co-morbidity symptoms are monitored and maintained or improved  Outcome: Progressing

## 2024-08-13 NOTE — PROGRESS NOTES
0710: Bedside and Verbal shift change report given to ARAMIS Dent (oncoming nurse) by ARAMIS Brown (offgoing nurse). Report included the following information Nurse Handoff Report, Index, Adult Overview, Surgery Report, Intake/Output, MAR, and Recent Results.

## 2024-08-13 NOTE — DISCHARGE SUMMARY
Obstetrical Discharge Summary     Name: Lucrecia Pacheco MRN: [id number]  SSN: [id number]    YOB: 1990  Age: 33 y.o.  Sex: female      Admit Date: 8/13/2024    Discharge Date: 8/14/2024     Admitting Physician: Rashaun Rome MD     Attending Physician:  Donaldo Jenkins II, MD     Admission Diagnoses: 38 weeks gestation of pregnancy [Z3A.38]  Discharge Diagnoses:   38 weeks gestation of pregnancy [Z3A.38]    Additional Diagnoses: Principal Problem:    38 weeks gestation of pregnancy  Resolved Problems:    * No resolved hospital problems. *       Immunization(s):   Immunization History   Administered Date(s) Administered    TDaP, ADACEL (age 10y-64y), BOOSTRIX (age 10y+), IM, 0.5mL 06/03/2024        Delivery Information:  Delivery Type: Vaginal, Spontaneous     By: RASHAUN ROME   On: 8/13/2024  at: 6:19 AM '     Hospital Course: Normal hospital course following the delivery.  The patient was released to her home in good condition.    Patient Instructions:     Reference my discharge instructions.    Current Discharge Medication List          I spent 10 minutes discharging the patient in face to face contact.    Follow-up Information       Follow up With Specialties Details Why Contact Info    Donaldo Jenkins II, MD Obstetrics & Gynecology, Gynecology, Obstetrics Follow up in 6 week(s) Post Partum [address] [address] [address] [phone]                 Signed By:  Donaldo Jenkins MD     August 13, 2024

## 2024-08-13 NOTE — PROGRESS NOTES
0230: Assumed care of patient. Patient states contractions started at 0000 about 6 minutes apart. Patient denies any LOF, vaginal bleeding, RUQ pain, HA, N/V, or vision changes. Patient endorses positive fetal movement.     0250: Patient consents to SVE.     0300: MD Ochoa at bedside discussing plan of care. Plan to admit patient.     0345: Patient sitting to edge of bed for epidural placement. Timeout performed.     0353: Negative test dose response    0400: Bolus dose given by Kaur Cruz CRNA.

## 2024-08-14 VITALS
SYSTOLIC BLOOD PRESSURE: 107 MMHG | OXYGEN SATURATION: 99 % | TEMPERATURE: 98.1 F | RESPIRATION RATE: 17 BRPM | HEART RATE: 76 BPM | DIASTOLIC BLOOD PRESSURE: 63 MMHG

## 2024-08-14 PROCEDURE — 94761 N-INVAS EAR/PLS OXIMETRY MLT: CPT

## 2024-08-14 PROCEDURE — 6370000000 HC RX 637 (ALT 250 FOR IP): Performed by: OBSTETRICS & GYNECOLOGY

## 2024-08-14 RX ORDER — IBUPROFEN 800 MG/1
800 TABLET ORAL EVERY 8 HOURS PRN
Qty: 60 TABLET | Refills: 0 | Status: SHIPPED | OUTPATIENT
Start: 2024-08-14 | End: 2024-09-13

## 2024-08-14 RX ORDER — ACETAMINOPHEN 500 MG
1000 TABLET ORAL EVERY 8 HOURS SCHEDULED
Status: DISCONTINUED | OUTPATIENT
Start: 2024-08-14 | End: 2024-08-14 | Stop reason: HOSPADM

## 2024-08-14 RX ADMIN — CHOLECALCIFEROL TAB 125 MCG (5000 UNIT) 5000 UNITS: 125 TAB at 11:17

## 2024-08-14 RX ADMIN — IBUPROFEN 800 MG: 800 TABLET, FILM COATED ORAL at 04:33

## 2024-08-14 RX ADMIN — DOCUSATE SODIUM 100 MG: 100 CAPSULE, LIQUID FILLED ORAL at 08:41

## 2024-08-14 RX ADMIN — ACETAMINOPHEN 1000 MG: 500 TABLET ORAL at 00:02

## 2024-08-14 ASSESSMENT — PAIN SCALES - GENERAL
PAINLEVEL_OUTOF10: 2
PAINLEVEL_OUTOF10: 3

## 2024-08-14 ASSESSMENT — PAIN DESCRIPTION - LOCATION
LOCATION: ABDOMEN
LOCATION: ABDOMEN

## 2024-08-14 ASSESSMENT — PAIN - FUNCTIONAL ASSESSMENT
PAIN_FUNCTIONAL_ASSESSMENT: ACTIVITIES ARE NOT PREVENTED
PAIN_FUNCTIONAL_ASSESSMENT: ACTIVITIES ARE NOT PREVENTED

## 2024-08-14 ASSESSMENT — PAIN DESCRIPTION - ORIENTATION
ORIENTATION: ANTERIOR
ORIENTATION: ANTERIOR

## 2024-08-14 ASSESSMENT — PAIN DESCRIPTION - DESCRIPTORS
DESCRIPTORS: CRAMPING
DESCRIPTORS: CRAMPING

## 2024-08-14 NOTE — PROGRESS NOTES
Post-Partum Day Number 1/Discharge Note    Progress note post vaginal delivery    Pt has no unusual postpartum complaints.  Pain is well controlled with current medications.  The baby is doing well.  Urinary output is adequate. Voiding without difficulty. The patient is ambulating well.  Tolerating a regular diet.  Wants to go home today.     Vitals:  Patient Vitals for the past 8 hrs:   BP Temp Temp src Pulse Resp SpO2   24 0524 107/63 98.1 °F (36.7 °C) Oral 76 17 99 %     Temp (24hrs), Av.1 °F (36.7 °C), Min:98.1 °F (36.7 °C), Max:98.2 °F (36.8 °C)      Exam:  Patient without distress.               Abdomen soft, fundus firm,  nontender               Perineum with normal lochia noted.               Lower extremities are negative for swelling, cords or tenderness.    Labs: No results found for this or any previous visit (from the past 24 hour(s)).    Assessment:     Status post vaginal delivery.  Doing well postpartum day 1.  Home today.    Plan:     Routine postpartum care at home.  Plan discharge for today--see discharge summary.

## 2024-08-14 NOTE — DISCHARGE INSTRUCTIONS
the calf, back of the knee, thigh, or groin.  Swelling in the leg or groin.  A color change on the leg or groin. The skin may be reddish or purplish, depending on your usual skin color.     You have signs of preeclampsia, such as:  Sudden swelling of your face, hands, or feet.  New vision problems (such as dimness, blurring, or seeing spots).  A severe headache.     You have signs of heart failure, such as:  New or increased shortness of breath.  New or worse swelling in your legs, ankles, or feet.  Sudden weight gain, such as more than 2 to 3 pounds in a day or 5 pounds in a week.  Feeling so tired or weak that you cannot do your usual activities.     You had spinal or epidural pain relief and have:  New or worse back pain.  Increased pain, swelling, warmth, or redness at the injection site.  Tingling, weakness, or numbness in your legs or groin.   Watch closely for changes in your health, and be sure to contact your doctor or midwife if:    Your vaginal bleeding isn't decreasing.     You feel sad, anxious, or hopeless for more than a few days.     You are having problems with your breasts or breastfeeding.   Where can you learn more?  Go to https://www.[employer].net/patientEd and enter A461 to learn more about \"After Your Delivery (the Postpartum Period): Care Instructions.\"  Current as of: July 10, 2023  Content Version: 14.1  © 4935-3926 [employer].   Care instructions adapted under license by [employer]. If you have questions about a medical condition or this instruction, always ask your healthcare professional. [employer] disclaims any warranty or liability for your use of this information.

## 2024-08-14 NOTE — CARE COORDINATION
8/14/2024  1:36 PM    CM met with DOLORES to complete initial assessment and begin discharge planning.  MOB verified and confirmed demographics.  DOLORES lives with FOB, at the address on file. DOLORES reports she has good family support, and feels like she has the support she needs when she returns home.  DOLORES plans to breast feed baby and has pump to use at home.  [facility] will provide follow up care for infant. DOLORES has car seat, bassinet/crib, clothing, bottles and all necessary supplies for baby.        08/14/24 6855   Service Assessment   Patient Orientation Alert and Oriented   Cognition Alert   History Provided By Patient   Primary Caregiver Self   Support Systems Spouse/Significant Other   PCP Verified by CM Yes   Last Visit to PCP Within last 3 months   Prior Functional Level Independent in ADLs/IADLs   Current Functional Level Independent in ADLs/IADLs   Can patient return to prior living arrangement Yes   Ability to make needs known: Good   Family able to assist with home care needs: Yes   Would you like for me to discuss the discharge plan with any other family members/significant others, and if so, who? No   Financial Resources Other (Comment)     Artur Hand CM

## 2024-08-14 NOTE — LACTATION NOTE
This note was copied from a baby's chart.  MOB states nursing is going well and denies pain. Mom breast fed her first for 9 months and her second for 18 months.  encourages her to continue feeding baby every 2-3 hours. Mom has a breast pump at home; LC measures for appropriate flange size. WARM line information provided. Preparation and storage of breast milk discussed. All questions answered.    Discussed with mother her plan for feeding.  Reviewed the benefits of exclusive breast milk feeding during the hospital stay. She acknowledges understanding of information reviewed and states that it is her plan to breastfeed her infant.  Will support her choice and offer additional information as needed.     Reviewed breastfeeding basics:  How milk is made and normal  breastfeeding behaviors discussed.  Supply and demand,  stomach size, early feeding cues, skin to skin bonding with comfortable positioning and baby led latch-on reviewed.  How to identify signs of successful breastfeeding sessions reviewed; education on asymetrical latch, signs of effective latching vs shallow, in-effective latching, normal  feeding frequency and duration and expected infant output discussed.  Breastfeeding Booklet and Warm line information provided with discussion.  Discussed typical  weight loss and the importance of pediatrician appointment within 24-48 hours of discharge, at 2 weeks of life and normalcy of requesting pediatric weight checks as needed in between visits.    Pt will successfully establish breastfeeding by feeding in response to early feeding cues   or wake every 3h, will obtain deep latch, and will keep log of feedings/output.  Taught to BF at hunger cues and or q 2-3 hrs and to offer 10-20 drops of hand expressed colostrum at any non-feeds.                  LATCH Documentation  Latch: Grasps breast, tongue down, lips flanged, rhythmic sucking  Audible Swallowing: Spontaneous and intermittent

## 2024-11-22 ENCOUNTER — POSTPARTUM VISIT (OUTPATIENT)
Age: 34
End: 2024-11-22
Payer: COMMERCIAL

## 2024-11-22 VITALS
SYSTOLIC BLOOD PRESSURE: 103 MMHG | BODY MASS INDEX: 27.71 KG/M2 | RESPIRATION RATE: 16 BRPM | HEIGHT: 68 IN | WEIGHT: 182.8 LBS | DIASTOLIC BLOOD PRESSURE: 66 MMHG | HEART RATE: 86 BPM

## 2024-11-22 DIAGNOSIS — Z01.419 ENCOUNTER FOR GYNECOLOGICAL EXAMINATION (GENERAL) (ROUTINE) WITHOUT ABNORMAL FINDINGS: Primary | ICD-10-CM

## 2024-11-22 DIAGNOSIS — Z80.3 FAMILY HISTORY OF BREAST CANCER: ICD-10-CM

## 2024-11-22 PROCEDURE — 99395 PREV VISIT EST AGE 18-39: CPT | Performed by: OBSTETRICS & GYNECOLOGY

## 2024-11-22 NOTE — PROGRESS NOTES
Lucrecia Pacheco is a 33 y.o. female returns for an annual exam     Chief Complaint   Patient presents with    Annual Exam       No LMP recorded. (Menstrual status: Breastfeeding).  Her periods are absent in flow. Breastfeeding.    Problems: no problems  Birth Control: none. Spouse thinking about vasectomy.    Last Pap: normal obtained 1/30/2023.  She does not have a history of MARY 2, 3 or cervical cancer.   With regard to the Gardisil vaccine, she has not received it yet      1. Have you been to the ER, urgent care clinic, or hospitalized since your last visit? No    2. Have you seen or consulted any other health care providers outside of the [facility] since your last visit? Yes    Examination chaperoned by ROSALIO STONE CMA.  
headache, confusion, weakness  Psych: anxiety, depression, change in mood  Heme/lymph: bleeding, bruising, pallor      Physical Exam  /66   Pulse 86   Resp 16   Ht 1.727 m (5' 8\")   Wt 82.9 kg (182 lb 12.8 oz)   BMI 27.79 kg/m²     Constitutional  Appearance: well-nourished, well developed, alert, in no acute distress    HENT  Head and Face: appears normal    Neck  Inspection/Palpation: normal appearance, no masses or tenderness  Lymph Nodes: no lymphadenopathy present  Thyroid: gland size normal, nontender, no nodules or masses present on palpation    Chest  Respiratory Effort: breathing unlabored  Auscultation: normal breath sounds    Cardiovascular  Heart:  Auscultation: regular rate and rhythm without murmur    Breasts  Inspection of Breasts: breasts symmetrical, no skin changes, no discharge present, nipple appearance normal, no skin retraction present  Palpation of Breasts and Axillae: no masses present on palpation, no breast tenderness  Axillary Lymph Nodes: no lymphadenopathy present    Gastrointestinal  Abdominal Examination: abdomen non-tender to palpation, normal bowel sounds, no masses present  Liver and spleen: no hepatomegaly present, spleen not palpable  Hernias: no hernias identified    Genitourinary  External Genitalia: normal appearance for age, no discharge present, no tenderness present, no inflammatory lesions present, no masses present, no atrophy present  Vagina: normal vaginal vault without central or paravaginal defects, no discharge present, no inflammatory lesions present, no masses present  Bladder: non-tender to palpation  Urethra: appears normal  Cervix: normal   Uterus: normal size, shape and consistency  Adnexa: no adnexal tenderness present, no adnexal masses present  Perineum: perineum within normal limits, no evidence of trauma, no rashes or skin lesions present  Anus: anus within normal limits, no hemorrhoids present  Inguinal Lymph Nodes: no lymphadenopathy

## 2024-11-28 LAB
CYTOLOGIST CVX/VAG CYTO: NORMAL
CYTOLOGY CVX/VAG DOC CYTO: NORMAL
CYTOLOGY CVX/VAG DOC THIN PREP: NORMAL
DX ICD CODE: NORMAL
HPV GENOTYPE REFLEX: NORMAL
HPV I/H RISK 4 DNA CVX QL PROBE+SIG AMP: NEGATIVE
Lab: NORMAL
OTHER STN SPEC: NORMAL
STAT OF ADQ CVX/VAG CYTO-IMP: NORMAL